# Patient Record
Sex: MALE | Race: BLACK OR AFRICAN AMERICAN | URBAN - METROPOLITAN AREA
[De-identification: names, ages, dates, MRNs, and addresses within clinical notes are randomized per-mention and may not be internally consistent; named-entity substitution may affect disease eponyms.]

---

## 2017-03-17 PROBLEM — R07.9 CHEST PAIN: Status: ACTIVE | Noted: 2017-03-17

## 2017-03-27 ENCOUNTER — OFFICE VISIT (OUTPATIENT)
Dept: INTERNAL MEDICINE CLINIC | Age: 52
End: 2017-03-27

## 2017-03-27 VITALS
OXYGEN SATURATION: 98 % | TEMPERATURE: 98.1 F | SYSTOLIC BLOOD PRESSURE: 120 MMHG | WEIGHT: 196 LBS | DIASTOLIC BLOOD PRESSURE: 96 MMHG | BODY MASS INDEX: 26.58 KG/M2 | HEART RATE: 74 BPM

## 2017-03-27 DIAGNOSIS — I10 ESSENTIAL HYPERTENSION: Primary | ICD-10-CM

## 2017-03-27 DIAGNOSIS — R07.9 CHEST PAIN, UNSPECIFIED TYPE: ICD-10-CM

## 2017-03-27 DIAGNOSIS — D86.9 SARCOIDOSIS: ICD-10-CM

## 2017-03-27 DIAGNOSIS — R91.8 PULMONARY NODULES: ICD-10-CM

## 2017-03-27 DIAGNOSIS — Z98.890 H/O LAMINECTOMY: ICD-10-CM

## 2017-03-27 PROCEDURE — 99204 OFFICE O/P NEW MOD 45 MIN: CPT | Performed by: NURSE PRACTITIONER

## 2017-03-27 RX ORDER — METOPROLOL SUCCINATE 50 MG/1
100 TABLET, EXTENDED RELEASE ORAL DAILY
COMMUNITY

## 2017-03-27 ASSESSMENT — ENCOUNTER SYMPTOMS
NAUSEA: 0
ROS SKIN COMMENTS: SARCOIDOSIS
SHORTNESS OF BREATH: 0
CHOKING: 0
APNEA: 0
BACK PAIN: 0
CONSTIPATION: 0
STRIDOR: 0
COUGH: 0
COLOR CHANGE: 0
VOMITING: 0
DIARRHEA: 0

## 2018-12-20 RX ORDER — SODIUM, POTASSIUM,MAG SULFATES 17.5-3.13G
1 SOLUTION, RECONSTITUTED, ORAL ORAL TAKE AS DIRECTED
Qty: 2 BOTTLE | Refills: 0 | Status: SHIPPED | OUTPATIENT
Start: 2018-12-20 | End: 2019-03-29

## 2018-12-28 ENCOUNTER — OUTSIDE FACILITY SERVICE (OUTPATIENT)
Dept: GASTROENTEROLOGY | Facility: CLINIC | Age: 53
End: 2018-12-28

## 2018-12-28 ENCOUNTER — TELEPHONE (OUTPATIENT)
Dept: GASTROENTEROLOGY | Facility: CLINIC | Age: 53
End: 2018-12-28

## 2018-12-28 PROCEDURE — G0105 COLORECTAL SCRN; HI RISK IND: HCPCS | Performed by: INTERNAL MEDICINE

## 2018-12-28 NOTE — TELEPHONE ENCOUNTER
----- Message from Peri Stanton Rep sent at 12/26/2018  1:50 PM EST -----  Regarding: PREP  PLEASE CALL MR [facility]ISOSTOMO IN TO [facility] IN [place]

## 2019-03-05 ENCOUNTER — TELEPHONE (OUTPATIENT)
Dept: ONCOLOGY | Facility: CLINIC | Age: 54
End: 2019-03-05

## 2019-03-05 NOTE — TELEPHONE ENCOUNTER
[phone] Dr. Matias Prater  [facility] for medical records to fax labs on patient ASAP for upcoming appt with us.  [id number]  ~Shell

## 2019-03-08 ENCOUNTER — LAB (OUTPATIENT)
Dept: LAB | Facility: HOSPITAL | Age: 54
End: 2019-03-08

## 2019-03-08 ENCOUNTER — CONSULT (OUTPATIENT)
Dept: ONCOLOGY | Facility: CLINIC | Age: 54
End: 2019-03-08

## 2019-03-08 VITALS
TEMPERATURE: 97.8 F | RESPIRATION RATE: 16 BRPM | DIASTOLIC BLOOD PRESSURE: 98 MMHG | BODY MASS INDEX: 30.01 KG/M2 | HEIGHT: 68 IN | WEIGHT: 198 LBS | SYSTOLIC BLOOD PRESSURE: 141 MMHG | HEART RATE: 79 BPM | OXYGEN SATURATION: 98 %

## 2019-03-08 DIAGNOSIS — E83.118 OTHER HEMOCHROMATOSIS: Primary | ICD-10-CM

## 2019-03-08 DIAGNOSIS — E83.118 OTHER HEMOCHROMATOSIS: ICD-10-CM

## 2019-03-08 LAB
ALBUMIN SERPL-MCNC: 4.44 G/DL (ref 3.2–4.8)
ALBUMIN/GLOB SERPL: 1.5 G/DL (ref 1.5–2.5)
ALP SERPL-CCNC: 61 U/L (ref 25–100)
ALT SERPL W P-5'-P-CCNC: 24 U/L (ref 7–40)
ANION GAP SERPL CALCULATED.3IONS-SCNC: 9 MMOL/L (ref 3–11)
AST SERPL-CCNC: 31 U/L (ref 0–33)
BILIRUB SERPL-MCNC: 0.9 MG/DL (ref 0.3–1.2)
BUN BLD-MCNC: 23 MG/DL (ref 9–23)
BUN/CREAT SERPL: 23 (ref 7–25)
CALCIUM SPEC-SCNC: 10.1 MG/DL (ref 8.7–10.4)
CHLORIDE SERPL-SCNC: 101 MMOL/L (ref 99–109)
CO2 SERPL-SCNC: 28 MMOL/L (ref 20–31)
CREAT BLD-MCNC: 1 MG/DL (ref 0.6–1.3)
CRP SERPL-MCNC: 0.02 MG/DL (ref 0–1)
ERYTHROCYTE [DISTWIDTH] IN BLOOD BY AUTOMATED COUNT: 13.5 % (ref 11.3–14.5)
ERYTHROCYTE [SEDIMENTATION RATE] IN BLOOD: 8 MM/HR (ref 0–20)
FERRITIN SERPL-MCNC: 717 NG/ML (ref 22–322)
GFR SERPL CREATININE-BSD FRML MDRD: 94 ML/MIN/1.73
GLOBULIN UR ELPH-MCNC: 3.1 GM/DL
GLUCOSE BLD-MCNC: 99 MG/DL (ref 70–100)
HCT VFR BLD AUTO: 43.9 % (ref 38.9–50.9)
HGB BLD-MCNC: 15.1 G/DL (ref 13.1–17.5)
IRON 24H UR-MRATE: 91 MCG/DL (ref 50–175)
IRON SATN MFR SERPL: 28 % (ref 20–50)
LYMPHOCYTES # BLD AUTO: 0.9 10*3/MM3 (ref 0.6–4.8)
LYMPHOCYTES NFR BLD AUTO: 19 % (ref 24–44)
MCH RBC QN AUTO: 29.5 PG (ref 27–31)
MCHC RBC AUTO-ENTMCNC: 34.4 G/DL (ref 32–36)
MCV RBC AUTO: 85.8 FL (ref 80–99)
MONOCYTES # BLD AUTO: 0.2 10*3/MM3 (ref 0–1)
MONOCYTES NFR BLD AUTO: 3.8 % (ref 0–12)
NEUTROPHILS # BLD AUTO: 3.9 10*3/MM3 (ref 1.5–8.3)
NEUTROPHILS NFR BLD AUTO: 77.2 % (ref 41–71)
PLATELET # BLD AUTO: 223 10*3/MM3 (ref 150–450)
PMV BLD AUTO: 8.1 FL (ref 6–12)
POTASSIUM BLD-SCNC: 4.2 MMOL/L (ref 3.5–5.5)
PROT SERPL-MCNC: 7.5 G/DL (ref 5.7–8.2)
RBC # BLD AUTO: 5.12 10*6/MM3 (ref 4.2–5.76)
SODIUM BLD-SCNC: 138 MMOL/L (ref 132–146)
TIBC SERPL-MCNC: 320 MCG/DL (ref 250–450)
WBC NRBC COR # BLD: 5 10*3/MM3 (ref 3.5–10.8)

## 2019-03-08 PROCEDURE — 82728 ASSAY OF FERRITIN: CPT

## 2019-03-08 PROCEDURE — 81256 HFE GENE: CPT

## 2019-03-08 PROCEDURE — 83550 IRON BINDING TEST: CPT

## 2019-03-08 PROCEDURE — 80053 COMPREHEN METABOLIC PANEL: CPT

## 2019-03-08 PROCEDURE — 85652 RBC SED RATE AUTOMATED: CPT

## 2019-03-08 PROCEDURE — 83540 ASSAY OF IRON: CPT

## 2019-03-08 PROCEDURE — 36415 COLL VENOUS BLD VENIPUNCTURE: CPT

## 2019-03-08 PROCEDURE — 86140 C-REACTIVE PROTEIN: CPT

## 2019-03-08 PROCEDURE — 99204 OFFICE O/P NEW MOD 45 MIN: CPT | Performed by: INTERNAL MEDICINE

## 2019-03-08 PROCEDURE — 85025 COMPLETE CBC W/AUTO DIFF WBC: CPT

## 2019-03-08 RX ORDER — METOPROLOL SUCCINATE 50 MG/1
100 TABLET, EXTENDED RELEASE ORAL
COMMUNITY

## 2019-03-08 RX ORDER — LISINOPRIL AND HYDROCHLOROTHIAZIDE 20; 12.5 MG/1; MG/1
1 TABLET ORAL
COMMUNITY
End: 2022-05-26

## 2019-03-08 RX ORDER — HYDROXYCHLOROQUINE SULFATE 200 MG/1
200 TABLET, FILM COATED ORAL
COMMUNITY
Start: 2017-03-17

## 2019-03-08 RX ORDER — ASPIRIN 81 MG/1
81 TABLET, CHEWABLE ORAL
COMMUNITY
Start: 2017-03-17

## 2019-03-08 RX ORDER — M-VIT,TX,IRON,MINS/CALC/FOLIC 27MG-0.4MG
1 TABLET ORAL
COMMUNITY
End: 2019-03-29

## 2019-03-08 RX ORDER — OMEPRAZOLE 20 MG/1
20 CAPSULE, DELAYED RELEASE ORAL
COMMUNITY

## 2019-03-08 RX ORDER — AMLODIPINE BESYLATE 10 MG/1
10 TABLET ORAL
COMMUNITY

## 2019-03-08 NOTE — PROGRESS NOTES
CHIEF COMPLAINT: Elevated liver enzymes    REASON FOR REFERRAL: Elevated liver enzymes      RECORDS OBTAINED  Records of the patients history including those obtained from Matias Prater were reviewed and summarized in detail.    Oncology/Hematology History    1. Elevated ferritin  2. Diverticulitis with colovesical fistula status post surgery May 2018  3. History of sarcoidosis  4. Reflux esophagitis    -3/8/2019: Initial visit with me.  2/15/2019 ferritin 686 with iron saturation 28% iron 90 and normal total iron binding capacity.  Has been drinking heavily by his own admission due to recent stressors.  Is due for neurosurgical intervention for cervical disc disease.  Has had a  colonoscopy with Dr. Larsen in the last few months.  Treats himself for reflux esophagitis as well.  I will check his CBC and CMP along with repeat ferritin and iron indices along with transferrin saturation and an HFE gene mutation.  I strongly suspect in light of the normal iron levels that the elevated ferritin is an acute phase reactant due to a multiplicity of possible inflammatory sources.        Other hemochromatosis    3/8/2019 Initial Diagnosis     Other hemochromatosis            HISTORY OF PRESENT ILLNESS:  The patient is a 54 y.o.  male, referred for elevated ferritin.  See above for his hematology history of present illness    REVIEW OF SYSTEMS:  A 14 point review of systems was performed and is negative except as noted above.    Past Medical History:   Diagnosis Date   • Anemia    • Arthritis    • Asthma    • Colonic fistula    • Hypertension      Past Surgical History:   Procedure Laterality Date   • APPENDECTOMY  1981   • COLON RESECTION     • KNEE ACL RECONSTRUCTION     • LAMINECTOMY      15 years.        Current Outpatient Medications on File Prior to Visit   Medication Sig Dispense Refill   • aspirin 81 MG chewable tablet Chew 81 mg.     • hydroxychloroquine (PLAQUENIL) 200 MG tablet Take 200 mg by mouth.     • amLODIPine  "(NORVASC) 10 MG tablet Take 10 mg by mouth.     • lisinopril-hydrochlorothiazide (PRINZIDE,ZESTORETIC) 20-12.5 MG per tablet Take 1 tablet by mouth.     • metoprolol succinate XL (TOPROL-XL) 50 MG 24 hr tablet Take 100 mg by mouth.     • omeprazole (priLOSEC) 20 MG capsule Take 20 mg by mouth.     • sodium-potassium-magnesium sulfates (SUPREP BOWEL PREP KIT) 17.5-3.13-1.6 GM/177ML solution oral solution Take 1 bottle by mouth Take As Directed. Follow instructions that were mailed to your home. If you didn't receive these call [phone]. 2 bottle 0   • therapeutic multivitamin-minerals (THERAGRAN-M) tablet Take 1 tablet by mouth.       No current facility-administered medications on file prior to visit.        No Known Allergies    Social History     Socioeconomic History   • Marital status:      Spouse name: Not on file   • Number of children: 2   • Years of education: Not on file   • Highest education level: Not on file   Tobacco Use   • Smoking status: Former Smoker   Substance and Sexual Activity   • Alcohol use: Yes     Frequency: 2-3 times a week   • Drug use: Yes     Types: Marijuana       Family History   Problem Relation Age of Onset   • Heart disease Father    • Alzheimer's disease Maternal Grandmother    • Heart block Maternal Grandmother    • Heart block Maternal Grandfather    • Diabetes Paternal Grandmother    • Heart attack Paternal Grandfather        PHYSICAL EXAM:    /98   Pulse 79   Temp 97.8 °F (36.6 °C) (Temporal)   Resp 16   Ht 172.7 cm (68\")   Wt 89.8 kg (198 lb)   SpO2 98%   BMI 30.11 kg/m²     ECOG: (1) Restricted in physically strenuous activity, ambulatory and able to do work of light nature  General: well appearing male in no acute distress  HEENT: sclera anicteric, oropharynx clear  Lymphatics: no cervical, supraclavicular, inguinal, or axillary adenopathy  Cardiovascular: regular rate and rhythm, no murmurs  Neck: Supple; No thyromegaly  Lungs: clear to " auscultation bilaterally. No respiratory distress.   Abdomen: soft, nontender, nondistended.  No palpable organomegaly  Extremities: no cyanosis, clubbing, edema, or cords  Skin: no rashes, lesions, bruising, or petechiae  Neuro: Alert and oriented x 4; Moving all extremities.  Psych: No anxiety or depression    No results found for: HGB, HCT, MCV, PLT, WBC, NEUTROABS, LYMPHSABS, MONOSABS, EOSABS, BASOSABS  No results found for: GLUCOSE, BUN, CREATININE, NA, K, CL, CO2, CALCIUM, PROTEINTOT, ALBUMIN, BILITOT, ALKPHOS, AST, ALT        Assessment/Plan     1. Elevated ferritin  2. Diverticulitis with colovesical fistula status post surgery May 2018  3. History of sarcoidosis  4. Reflux esophagitis    -3/8/2019: Initial visit with me.  2/15/2019 ferritin 686 with iron saturation 28% iron 90 and normal total iron binding capacity.  Has been drinking heavily by his own admission due to recent stressors.  Is due for neurosurgical intervention for cervical disc disease.  Has had a  colonoscopy with Dr. Larsen in the last few months.  Treats himself for reflux esophagitis as well.  I will check his CBC and CMP along with repeat ferritin and iron indices along with transferrin saturation and an HFE gene mutation.  I strongly suspect in light of the normal iron levels that the elevated ferritin is an acute phase reactant due to a multiplicity of possible inflammatory sources.    I discussed with patient 45 minutes greater than 50% spent counseling    Stephen Clifton MD    3/8/2019

## 2019-03-12 LAB — HFE GENE MUT ANL BLD/T: NORMAL

## 2019-03-29 ENCOUNTER — OFFICE VISIT (OUTPATIENT)
Dept: ONCOLOGY | Facility: CLINIC | Age: 54
End: 2019-03-29

## 2019-03-29 VITALS
SYSTOLIC BLOOD PRESSURE: 140 MMHG | HEART RATE: 81 BPM | RESPIRATION RATE: 18 BRPM | BODY MASS INDEX: 30.62 KG/M2 | TEMPERATURE: 97.8 F | WEIGHT: 202 LBS | DIASTOLIC BLOOD PRESSURE: 95 MMHG | OXYGEN SATURATION: 99 % | HEIGHT: 68 IN

## 2019-03-29 DIAGNOSIS — R79.89 ELEVATED FERRITIN: Primary | ICD-10-CM

## 2019-03-29 PROCEDURE — 99213 OFFICE O/P EST LOW 20 MIN: CPT | Performed by: INTERNAL MEDICINE

## 2019-03-29 RX ORDER — FERROUS SULFATE 325(65) MG
325 TABLET ORAL
COMMUNITY

## 2019-03-29 NOTE — PROGRESS NOTES
CHIEF COMPLAINT: Elevated ferritin    Problem List:  Oncology/Hematology History    1. Elevated ferritin  2. Diverticulitis with colovesical fistula status post surgery May 2018  3. History of sarcoidosis  4. Reflux esophagitis    -3/8/2019: Initial visit with me.  2/15/2019 ferritin 686 with iron saturation 28% iron 90 and normal total iron binding capacity.  Has been drinking heavily by his own admission due to recent stressors.  Is due for neurosurgical intervention for cervical disc disease.  Has had a  colonoscopy with Dr. Larsen in the last few months.  Treats himself for reflux esophagitis as well.  I will check his CBC and CMP along with repeat ferritin and iron indices along with transferrin saturation and an HFE gene mutation.  I strongly suspect in light of the normal iron levels that the elevated ferritin is an acute phase reactant due to a multiplicity of possible inflammatory sources.    -3/8/2019 data: HFE gene mutation negative.  CBC normal.  Sedimentation rate and C-reactive protein normal.  Iron indices normal.  Liver enzymes normal.  Ferritin 717    -3/29/2019 visit: He is feeling fine with no specific complaints.  I am not sure why his ferritin was checked to begin with but the rest of his labs are unremarkable.  He had been drinking heavily due to stress and has said that has decreased.  Liver injury can cause elevation in iron and vice versa but I do not see elevation of his iron just the ferritin and that makes hemochromatosis unlikely with the negative HFE gene mutation.  I suggested he continue to stay sober and to follow-up with Dr. Larsen.        Other hemochromatosis    3/8/2019 Initial Diagnosis     Other hemochromatosis            HISTORY OF PRESENT ILLNESS:  The patient is a 54 y.o. male, here for follow up on management of elevated ferritin.  See above for hematology history of present illness      Past Medical History:   Diagnosis Date   • Anemia    • Arthritis    • Asthma    • Colonic  "fistula    • Hypertension      Past Surgical History:   Procedure Laterality Date   • APPENDECTOMY  1981   • COLON RESECTION     • KNEE ACL RECONSTRUCTION     • LAMINECTOMY      15 years.        No Known Allergies    Family History and Social History reviewed and changed as necessary      REVIEW OF SYSTEM:   Review of Systems   Constitutional: Negative for appetite change, chills, diaphoresis, fatigue, fever and unexpected weight change.   HENT:   Negative for mouth sores, sore throat and trouble swallowing.    Eyes: Negative for icterus.   Respiratory: Negative for cough, hemoptysis and shortness of breath.    Cardiovascular: Negative for chest pain, leg swelling and palpitations.   Gastrointestinal: Negative for abdominal distention, abdominal pain, blood in stool, constipation, diarrhea, nausea and vomiting.   Endocrine: Negative for hot flashes.   Genitourinary: Negative for bladder incontinence, difficulty urinating, dysuria, frequency and hematuria.    Musculoskeletal: Negative for gait problem, neck pain and neck stiffness.   Skin: Negative for rash.   Neurological: Negative for dizziness, gait problem, headaches, light-headedness and numbness.   Hematological: Negative for adenopathy. Does not bruise/bleed easily.   Psychiatric/Behavioral: Negative for depression. The patient is not nervous/anxious.    All other systems reviewed and are negative.       PHYSICAL EXAM    Vitals:    03/29/19 1131   BP: 140/95   Pulse: 81   Resp: 18   Temp: 97.8 °F (36.6 °C)   SpO2: 99%   Weight: 91.6 kg (202 lb)   Height: 172.7 cm (68\")     Constitutional: Appears well-developed and well-nourished. No distress.   ECOG: (0) Fully active, able to carry on all predisease performance without restriction  HENT:   Head: Normocephalic.   Mouth/Throat: Oropharynx is clear and moist.   Eyes: Conjunctivae are normal. Pupils are equal, round, and reactive to light. No scleral icterus.   Neck: Neck supple. No JVD present. No thyromegaly " present.   Cardiovascular: Normal rate, regular rhythm and normal heart sounds.    Pulmonary/Chest: Breath sounds normal. No respiratory distress.   Abdominal: Soft. Exhibits no distension and no mass. There is no hepatosplenomegaly. There is no tenderness. There is no rebound and no guarding.   Musculoskeletal:Exhibits no edema, tenderness or deformity.   Neurological: Alert and oriented to person, place, and time. Exhibits normal muscle tone.   Skin: No ecchymosis, no petechiae and no rash noted. Not diaphoretic. No cyanosis. Nails show no clubbing.   Psychiatric: Normal mood and affect.   Vitals reviewed.      Lab Results   Component Value Date    HGB 15.1 03/08/2019    HCT 43.9 03/08/2019    MCV 85.8 03/08/2019     03/08/2019    WBC 5.00 03/08/2019    NEUTROABS 3.90 03/08/2019    LYMPHSABS 0.90 03/08/2019    MONOSABS 0.20 03/08/2019       Lab Results   Component Value Date    GLUCOSE 99 03/08/2019    BUN 23 03/08/2019    CREATININE 1.00 03/08/2019     03/08/2019    K 4.2 03/08/2019     03/08/2019    CO2 28.0 03/08/2019    CALCIUM 10.1 03/08/2019    PROTEINTOT 7.5 03/08/2019    ALBUMIN 4.44 03/08/2019    BILITOT 0.9 03/08/2019    ALKPHOS 61 03/08/2019    AST 31 03/08/2019    ALT 24 03/08/2019                   ASSESSMENT & PLAN:    1. Elevated ferritin  2. Diverticulitis with colovesical fistula status post surgery May 2018  3. History of sarcoidosis  4. Reflux esophagitis    -3/8/2019: Initial visit with me.  2/15/2019 ferritin 686 with iron saturation 28% iron 90 and normal total iron binding capacity.  Has been drinking heavily by his own admission due to recent stressors.  Is due for neurosurgical intervention for cervical disc disease.  Has had a  colonoscopy with Dr. Larsen in the last few months.  Treats himself for reflux esophagitis as well.  I will check his CBC and CMP along with repeat ferritin and iron indices along with transferrin saturation and an HFE gene mutation.  I strongly  suspect in light of the normal iron levels that the elevated ferritin is an acute phase reactant due to a multiplicity of possible inflammatory sources.    -3/8/2019 data: HFE gene mutation negative.  CBC normal.  Sedimentation rate and C-reactive protein normal.  Iron indices normal.  Liver enzymes normal.  Ferritin 717    -3/29/2019 visit: He is feeling fine with no specific complaints.  I am not sure why his ferritin was checked to begin with but the rest of his labs are unremarkable.  He had been drinking heavily due to stress and has said that has decreased.  Liver injury can cause elevation in iron and vice versa but I do not see elevation of his iron just the ferritin and that makes hemochromatosis unlikely with the negative HFE gene mutation.  I suggested he continue to stay sober and to follow-up with Dr. Larsen.  He needs to follow-up with primary care and whoever else had been managing his sarcoidosis in the past.  This could certainly be a cause for inflammatory related ferritin elevation but nothing from a hematological standpoint is found to suggest need for further hematology/oncology follow-up.      Stephen Clifton MD    03/29/2019

## 2020-08-04 PROCEDURE — U0003 INFECTIOUS AGENT DETECTION BY NUCLEIC ACID (DNA OR RNA); SEVERE ACUTE RESPIRATORY SYNDROME CORONAVIRUS 2 (SARS-COV-2) (CORONAVIRUS DISEASE [COVID-19]), AMPLIFIED PROBE TECHNIQUE, MAKING USE OF HIGH THROUGHPUT TECHNOLOGIES AS DESCRIBED BY CMS-2020-01-R: HCPCS | Performed by: FAMILY MEDICINE

## 2020-08-06 ENCOUNTER — TELEPHONE (OUTPATIENT)
Dept: URGENT CARE | Facility: CLINIC | Age: 55
End: 2020-08-06

## 2020-08-06 NOTE — TELEPHONE ENCOUNTER
Reviewed By Dr. Oliver. Spoke with Pt informed lab result was not detected. Pt verbalized understanding stated his symptoms have not improved. Informed Pt if symptoms persists to follow up with PCP for further evaluation. Also informed Pt the CDC still recommends 10 day of quarantine from onset of symptoms. Pt verbalized understanding no further questions.

## 2020-08-31 ENCOUNTER — TELEPHONE (OUTPATIENT)
Dept: ONCOLOGY | Facility: CLINIC | Age: 55
End: 2020-08-31

## 2020-08-31 NOTE — TELEPHONE ENCOUNTER
FYI-REC'VD FAX THROUGH [employer] FROM DR. CHAO AT [facility] FOR DR. BOWERS TO REVIEW PT RECORDS. PT LAST SAW DR. BOWERS 3/29/19.   RECORDS ARE LABELED AS EXTERNAL MED RECS 8/28/20

## 2020-11-02 ENCOUNTER — CONSULT (OUTPATIENT)
Dept: ONCOLOGY | Facility: CLINIC | Age: 55
End: 2020-11-02

## 2020-11-02 ENCOUNTER — LAB (OUTPATIENT)
Dept: LAB | Facility: HOSPITAL | Age: 55
End: 2020-11-02

## 2020-11-02 VITALS
HEART RATE: 88 BPM | BODY MASS INDEX: 41.52 KG/M2 | TEMPERATURE: 96.9 F | DIASTOLIC BLOOD PRESSURE: 102 MMHG | SYSTOLIC BLOOD PRESSURE: 174 MMHG | OXYGEN SATURATION: 97 % | RESPIRATION RATE: 20 BRPM | WEIGHT: 274 LBS | HEIGHT: 68 IN

## 2020-11-02 DIAGNOSIS — R79.89 ELEVATED FERRITIN: ICD-10-CM

## 2020-11-02 DIAGNOSIS — R79.89 ELEVATED FERRITIN: Primary | ICD-10-CM

## 2020-11-02 LAB
ALBUMIN SERPL-MCNC: 4.2 G/DL (ref 3.5–5.2)
ALBUMIN/GLOB SERPL: 1.3 G/DL
ALP SERPL-CCNC: 81 U/L (ref 39–117)
ALT SERPL W P-5'-P-CCNC: 22 U/L (ref 1–41)
ANION GAP SERPL CALCULATED.3IONS-SCNC: 14 MMOL/L (ref 5–15)
AST SERPL-CCNC: 29 U/L (ref 1–40)
BILIRUB SERPL-MCNC: 0.3 MG/DL (ref 0–1.2)
BUN SERPL-MCNC: 15 MG/DL (ref 6–20)
BUN/CREAT SERPL: 15.3 (ref 7–25)
CALCIUM SPEC-SCNC: 9.8 MG/DL (ref 8.6–10.5)
CHLORIDE SERPL-SCNC: 98 MMOL/L (ref 98–107)
CO2 SERPL-SCNC: 26 MMOL/L (ref 22–29)
CREAT SERPL-MCNC: 0.98 MG/DL (ref 0.76–1.27)
CRP SERPL-MCNC: 0.69 MG/DL (ref 0–0.5)
ERYTHROCYTE [DISTWIDTH] IN BLOOD BY AUTOMATED COUNT: 15.3 % (ref 12.3–15.4)
ERYTHROCYTE [SEDIMENTATION RATE] IN BLOOD: 23 MM/HR (ref 0–20)
FERRITIN SERPL-MCNC: 943.9 NG/ML (ref 30–400)
GFR SERPL CREATININE-BSD FRML MDRD: 96 ML/MIN/1.73
GLOBULIN UR ELPH-MCNC: 3.3 GM/DL
GLUCOSE SERPL-MCNC: 112 MG/DL (ref 65–99)
HCT VFR BLD AUTO: 41.9 % (ref 37.5–51)
HGB BLD-MCNC: 13.8 G/DL (ref 13–17.7)
IRON 24H UR-MRATE: 50 MCG/DL (ref 59–158)
IRON SATN MFR SERPL: 13 % (ref 20–50)
LYMPHOCYTES # BLD AUTO: 1.7 10*3/MM3 (ref 0.7–3.1)
LYMPHOCYTES NFR BLD AUTO: 23.4 % (ref 19.6–45.3)
MCH RBC QN AUTO: 28.4 PG (ref 26.6–33)
MCHC RBC AUTO-ENTMCNC: 33 G/DL (ref 31.5–35.7)
MCV RBC AUTO: 86 FL (ref 79–97)
MONOCYTES # BLD AUTO: 0.6 10*3/MM3 (ref 0.1–0.9)
MONOCYTES NFR BLD AUTO: 7.5 % (ref 5–12)
NEUTROPHILS NFR BLD AUTO: 5.1 10*3/MM3 (ref 1.7–7)
NEUTROPHILS NFR BLD AUTO: 69.1 % (ref 42.7–76)
PLATELET # BLD AUTO: 299 10*3/MM3 (ref 140–450)
PMV BLD AUTO: 7.4 FL (ref 6–12)
POTASSIUM SERPL-SCNC: 4.3 MMOL/L (ref 3.5–5.2)
PROT SERPL-MCNC: 7.5 G/DL (ref 6–8.5)
RBC # BLD AUTO: 4.87 10*6/MM3 (ref 4.14–5.8)
SODIUM SERPL-SCNC: 138 MMOL/L (ref 136–145)
TIBC SERPL-MCNC: 380 MCG/DL (ref 298–536)
TRANSFERRIN SERPL-MCNC: 255 MG/DL (ref 200–360)
WBC # BLD AUTO: 7.4 10*3/MM3 (ref 3.4–10.8)

## 2020-11-02 PROCEDURE — 85025 COMPLETE CBC W/AUTO DIFF WBC: CPT

## 2020-11-02 PROCEDURE — 84466 ASSAY OF TRANSFERRIN: CPT

## 2020-11-02 PROCEDURE — 83540 ASSAY OF IRON: CPT

## 2020-11-02 PROCEDURE — 99215 OFFICE O/P EST HI 40 MIN: CPT | Performed by: INTERNAL MEDICINE

## 2020-11-02 PROCEDURE — 36415 COLL VENOUS BLD VENIPUNCTURE: CPT

## 2020-11-02 PROCEDURE — 85652 RBC SED RATE AUTOMATED: CPT

## 2020-11-02 PROCEDURE — 80053 COMPREHEN METABOLIC PANEL: CPT

## 2020-11-02 PROCEDURE — 82728 ASSAY OF FERRITIN: CPT

## 2020-11-02 PROCEDURE — 86140 C-REACTIVE PROTEIN: CPT

## 2020-11-02 NOTE — PROGRESS NOTES
CHIEF COMPLAINT: Elevated ferritin    Problem List:  Oncology/Hematology History Overview Note   1. Elevated ferritin  2. Diverticulitis with colovesical fistula status post surgery May 2018  3. History of sarcoidosis  4. Reflux esophagitis    -3/8/2019: Initial visit with me.  2/15/2019 ferritin 686 with iron saturation 28% iron 90 and normal total iron binding capacity.  Has been drinking heavily by his own admission due to recent stressors.  Is due for neurosurgical intervention for cervical disc disease.  Has had a  colonoscopy with Dr. Larsen in the last few months.  Treats himself for reflux esophagitis as well.  I will check his CBC and CMP along with repeat ferritin and iron indices along with transferrin saturation and an HFE gene mutation.  I strongly suspect in light of the normal iron levels that the elevated ferritin is an acute phase reactant due to a multiplicity of possible inflammatory sources.    -3/8/2019 data: HFE gene mutation negative.  CBC normal.  Sedimentation rate and C-reactive protein normal.  Iron indices normal.  Liver enzymes normal.  Ferritin 717    -3/29/2019 visit: He is feeling fine with no specific complaints.  I am not sure why his ferritin was checked to begin with but the rest of his labs are unremarkable.  He had been drinking heavily due to stress and has said that has decreased.  Liver injury can cause elevation in iron and vice versa but I do not see elevation of his iron just the ferritin and that makes hemochromatosis unlikely with the negative HFE gene mutation.  I suggested he continue to stay sober and to follow-up with Dr. Larsen.    -8/21/2020 data: Ferritin 1235 with iron 86, total iron-binding capacity 265.  B12 414.  Folate 2.1.  Hemoglobin 12.1.  Creatinine improved to 1.32 from 2.38 on 8/17/2020.  AST 23 with ALT 21.  Complains of gynecomastia.    -11/2/2020 hematology follow-up visit: Ferritin not over 3000 making HLH or intense inflammatory response to sepsis  unlikely and does not fit the clinical picture.  We will check transferrin saturation and if less than 45%, iron overload very unlikely especially with negative HFE gene mutation.  If transferrin saturation turns out to be over 45% then, even with negative HFE gene mutation, I would still get MRI of liver or liver biopsy for liver iron quantitation though I think the odds of that is very unlikely.    Causes of high ferritin include:  A.  Intense inflammatory response with release of ferritin from necrotic cells such as sepsis, HLH, fulminant hepatic failure, hematologic malignancies, or stills disease but typically the ferritin is usually over 3000 mcg/L.  Hence this is unlikely  B.  Iron overload states such as hemochromatosis, transfusional iron overload, or ineffective erythropoietic syndrome such as thalassemia that cause increased iron absorption.  In these patients in men, the ferritin is usually over 200 to 300 mcg/L and are often over 1447-9430 mcg/L at the time of diagnosis.  He has never been anemic nor microcytic.  Hence, this is an unlikely cause  C.  Chronic liver disease such as hepatitis, HDEZ, or alcoholic liver disease and is usually associated with a ferritin of 300 to 1000 mcg/L without necessarily concomitant iron overload.  Though over thousand, with his alcohol history this is a possibility though his liver enzymes have been normal.  D.  Other chronic conditions such as anemia of chronic disease, metabolic syndrome (obesity, dyslipidemia, hypertension, insulin resistance) or hyperthyroidism.  Usually the ferritin is less than 2-3 times the upper limit of normal of 322 mcg/L.  Hence, with ferritin over thousand this would be unlikely.    Therefore, I will attempt to get MRI liver iron quantitation for completeness sake and ask him to follow-up as I have previously asked him to follow-up with gastroenterology, Dr. Larsen.  Having said all that, I strongly doubt he has hemochromatosis though I will  repeat his ferritin, sedimentation rate, C-reactive protein, transferrin saturation, and iron indices and do a phone visit with him to go over all this.     Elevated ferritin   3/8/2019 Initial Diagnosis    Other hemochromatosis         HISTORY OF PRESENT ILLNESS:  The patient is a 55 y.o. male, here for follow up on management of elevated ferritin.  Over thousand in August.      Past Medical History:   Diagnosis Date   • Anemia    • Arthritis    • Asthma    • Colonic fistula    • Hypertension      Past Surgical History:   Procedure Laterality Date   • APPENDECTOMY  1981   • COLON RESECTION     • KNEE ACL RECONSTRUCTION     • LAMINECTOMY      15 years.        No Known Allergies    Family History and Social History reviewed and changed as necessary      REVIEW OF SYSTEM:   Review of Systems   Constitutional: Negative for appetite change, chills, diaphoresis, fatigue, fever and unexpected weight change.   HENT:   Negative for mouth sores, sore throat and trouble swallowing.    Eyes: Negative for icterus.   Respiratory: Negative for cough, hemoptysis and shortness of breath.    Cardiovascular: Negative for chest pain, leg swelling and palpitations.   Gastrointestinal: Negative for abdominal distention, abdominal pain, blood in stool, constipation, diarrhea, nausea and vomiting.   Endocrine: Negative for hot flashes.   Genitourinary: Negative for bladder incontinence, difficulty urinating, dysuria, frequency and hematuria.    Musculoskeletal: Negative for gait problem, neck pain and neck stiffness.   Skin: Negative for rash.   Neurological: Negative for dizziness, gait problem, headaches, light-headedness and numbness.   Hematological: Negative for adenopathy. Does not bruise/bleed easily.   Psychiatric/Behavioral: Negative for depression. The patient is not nervous/anxious.    All other systems reviewed and are negative.       PHYSICAL EXAM    Vitals:    11/02/20 1422   BP: (!) 174/102   Pulse: 88   Resp: 20   Temp:  "96.9 °F (36.1 °C)   SpO2: 97%   Weight: 124 kg (274 lb)   Height: 172.7 cm (68\")     Vitals:    11/02/20 1422   PainSc: 0-No pain        Constitutional: Appears well-developed and well-nourished. No distress.   ECOG: (0) Fully Active - Able to Carry On All Pre-disease Performance Without Restriction  HENT:   Head: Normocephalic.   Mouth/Throat: Oropharynx is clear and moist.   Eyes: Conjunctivae are normal. Pupils are equal, round, and reactive to light. No scleral icterus.   Neck: Neck supple. No JVD present. No thyromegaly present.   Cardiovascular: Normal rate, regular rhythm and normal heart sounds.    Pulmonary/Chest: Breath sounds normal. No respiratory distress.   Abdominal: Soft. Exhibits no distension and no mass. There is no hepatosplenomegaly. There is no tenderness. There is no rebound and no guarding.   Musculoskeletal:Exhibits no edema, tenderness or deformity.   Neurological: Alert and oriented to person, place, and time. Exhibits normal muscle tone.   Skin: No ecchymosis, no petechiae and no rash noted. Not diaphoretic. No cyanosis. Nails show no clubbing.   Psychiatric: Normal mood and affect.   Vitals reviewed.      Lab Results   Component Value Date    HGB 15.1 03/08/2019    HCT 43.9 03/08/2019    MCV 85.8 03/08/2019     03/08/2019    WBC 5.00 03/08/2019    NEUTROABS 3.90 03/08/2019    LYMPHSABS 0.90 03/08/2019    MONOSABS 0.20 03/08/2019       Lab Results   Component Value Date    GLUCOSE 99 03/08/2019    BUN 23 03/08/2019    CREATININE 1.00 03/08/2019     03/08/2019    K 4.2 03/08/2019     03/08/2019    CO2 28.0 03/08/2019    CALCIUM 10.1 03/08/2019    PROTEINTOT 7.5 03/08/2019    ALBUMIN 4.44 03/08/2019    BILITOT 0.9 03/08/2019    ALKPHOS 61 03/08/2019    AST 31 03/08/2019    ALT 24 03/08/2019                   ASSESSMENT & PLAN:  5. Elevated ferritin  6. Diverticulitis with colovesical fistula status post surgery May 2018  7. History of sarcoidosis  8. Reflux " esophagitis    -3/8/2019: Initial visit with me.  2/15/2019 ferritin 686 with iron saturation 28% iron 90 and normal total iron binding capacity.  Has been drinking heavily by his own admission due to recent stressors.  Is due for neurosurgical intervention for cervical disc disease.  Has had a  colonoscopy with Dr. Larsen in the last few months.  Treats himself for reflux esophagitis as well.  I will check his CBC and CMP along with repeat ferritin and iron indices along with transferrin saturation and an HFE gene mutation.  I strongly suspect in light of the normal iron levels that the elevated ferritin is an acute phase reactant due to a multiplicity of possible inflammatory sources.    -3/8/2019 data: HFE gene mutation negative.  CBC normal.  Sedimentation rate and C-reactive protein normal.  Iron indices normal.  Liver enzymes normal.  Ferritin 717    -3/29/2019 visit: He is feeling fine with no specific complaints.  I am not sure why his ferritin was checked to begin with but the rest of his labs are unremarkable.  He had been drinking heavily due to stress and has said that has decreased.  Liver injury can cause elevation in iron and vice versa but I do not see elevation of his iron just the ferritin and that makes hemochromatosis unlikely with the negative HFE gene mutation.  I suggested he continue to stay sober and to follow-up with Dr. Larsen.    -8/21/2020 data: Ferritin 1235 with iron 86, total iron-binding capacity 265.  B12 414.  Folate 2.1.  Hemoglobin 12.1.  Creatinine improved to 1.32 from 2.38 on 8/17/2020.  AST 23 with ALT 21.  Complains of gynecomastia.    -11/2/2020 hematology follow-up visit: Ferritin not over 3000 making HLH or intense inflammatory response to sepsis unlikely and does not fit the clinical picture.  We will check transferrin saturation and if less than 45%, iron overload very unlikely especially with negative HFE gene mutation.  If transferrin saturation turns out to be over 45%  then, even with negative HFE gene mutation, I would still get MRI of liver or liver biopsy for liver iron quantitation though I think the odds of that is very unlikely.    Causes of high ferritin include:  A.  Intense inflammatory response with release of ferritin from necrotic cells such as sepsis, HLH, fulminant hepatic failure, hematologic malignancies, or stills disease but typically the ferritin is usually over 3000 mcg/L.  Hence this is unlikely  B.  Iron overload states such as hemochromatosis, transfusional iron overload, or ineffective erythropoietic syndrome such as thalassemia that cause increased iron absorption.  In these patients in men, the ferritin is usually over 200 to 300 mcg/L and are often over 8337-0367 mcg/L at the time of diagnosis.  He has never been anemic nor microcytic.  Hence, this is an unlikely cause  C.  Chronic liver disease such as hepatitis, HDEZ, or alcoholic liver disease and is usually associated with a ferritin of 300 to 1000 mcg/L without necessarily concomitant iron overload.  Though over thousand, with his alcohol history this is a possibility though his liver enzymes have been normal.  D.  Other chronic conditions such as anemia of chronic disease, metabolic syndrome (obesity, dyslipidemia, hypertension, insulin resistance) or hyperthyroidism.  Usually the ferritin is less than 2-3 times the upper limit of normal of 322 mcg/L.  Hence, with ferritin over thousand this would be unlikely.    Therefore, I will attempt to get MRI liver iron quantitation for completeness sake and ask him to follow-up as I have previously asked him to follow-up with gastroenterology, Dr. Larsen.  Having said all that, I strongly doubt he has hemochromatosis though I will repeat his ferritin, sedimentation rate, C-reactive protein, transferrin saturation, and iron indices and do a phone visit with him to go over all this.  Discussed with patient face-to-face 40 minutes greater than 50% spent  counseling regarding the above plan as outlined.          Stephen Clifton MD    11/02/2020

## 2020-11-18 ENCOUNTER — APPOINTMENT (OUTPATIENT)
Dept: MRI IMAGING | Facility: HOSPITAL | Age: 55
End: 2020-11-18

## 2020-11-19 ENCOUNTER — HOSPITAL ENCOUNTER (OUTPATIENT)
Dept: MRI IMAGING | Facility: HOSPITAL | Age: 55
Discharge: HOME | End: 2020-11-19
Admitting: INTERNAL MEDICINE

## 2020-11-19 DIAGNOSIS — R79.89 ELEVATED FERRITIN: ICD-10-CM

## 2020-11-19 PROCEDURE — 74181 MRI ABDOMEN W/O CONTRAST: CPT

## 2020-11-23 ENCOUNTER — OFFICE VISIT (OUTPATIENT)
Dept: ONCOLOGY | Facility: CLINIC | Age: 55
End: 2020-11-23

## 2020-11-23 VITALS — WEIGHT: 274 LBS | HEIGHT: 68 IN | BODY MASS INDEX: 41.52 KG/M2

## 2020-11-23 DIAGNOSIS — R79.89 ELEVATED FERRITIN: Primary | ICD-10-CM

## 2020-11-23 PROCEDURE — 99442 PR PHYS/QHP TELEPHONE EVALUATION 11-20 MIN: CPT | Performed by: INTERNAL MEDICINE

## 2020-11-23 NOTE — PROGRESS NOTES
Telehealth follow-up visit:  This visit has been rescheduled as a phone visit to comply with patient safety concerns in accordance with CDC recommendations. Total time of discussion was 15 minutes.  Verbal consent given    CHIEF COMPLAINT: Elevated ferritin likely due to Charles    Problem List:  Oncology/Hematology History Overview Note   1. Elevated ferritin  2. Diverticulitis with colovesical fistula status post surgery May 2018  3. History of sarcoidosis  4. Reflux esophagitis    -3/8/2019: Initial visit with me.  2/15/2019 ferritin 686 with iron saturation 28% iron 90 and normal total iron binding capacity.  Has been drinking heavily by his own admission due to recent stressors.  Is due for neurosurgical intervention for cervical disc disease.  Has had a  colonoscopy with Dr. Larsen in the last few months.  Treats himself for reflux esophagitis as well.  I will check his CBC and CMP along with repeat ferritin and iron indices along with transferrin saturation and an HFE gene mutation.  I strongly suspect in light of the normal iron levels that the elevated ferritin is an acute phase reactant due to a multiplicity of possible inflammatory sources.    -3/8/2019 data: HFE gene mutation negative.  CBC normal.  Sedimentation rate and C-reactive protein normal.  Iron indices normal.  Liver enzymes normal.  Ferritin 717    -3/29/2019 visit: He is feeling fine with no specific complaints.  I am not sure why his ferritin was checked to begin with but the rest of his labs are unremarkable.  He had been drinking heavily due to stress and has said that has decreased.  Liver injury can cause elevation in iron and vice versa but I do not see elevation of his iron just the ferritin and that makes hemochromatosis unlikely with the negative HFE gene mutation.  I suggested he continue to stay sober and to follow-up with Dr. Larsen.    -8/21/2020 data: Ferritin 1235 with iron 86, total iron-binding capacity 265.  B12 414.  Folate 2.1.   Hemoglobin 12.1.  Creatinine improved to 1.32 from 2.38 on 8/17/2020.  AST 23 with ALT 21.  Complains of gynecomastia.    -11/2/2020 hematology follow-up visit: Ferritin not over 3000 making HLH or intense inflammatory response to sepsis unlikely and does not fit the clinical picture.  We will check transferrin saturation and if less than 45%, iron overload very unlikely especially with negative HFE gene mutation.  If transferrin saturation turns out to be over 45% then, even with negative HFE gene mutation, I would still get MRI of liver or liver biopsy for liver iron quantitation though I think the odds of that is very unlikely.    Causes of high ferritin include:  A.  Intense inflammatory response with release of ferritin from necrotic cells such as sepsis, HLH, fulminant hepatic failure, hematologic malignancies, or stills disease but typically the ferritin is usually over 3000 mcg/L.  Hence this is unlikely  B.  Iron overload states such as hemochromatosis, transfusional iron overload, or ineffective erythropoietic syndrome such as thalassemia that cause increased iron absorption.  In these patients in men, the ferritin is usually over 200 to 300 mcg/L and are often over 2613-1297 mcg/L at the time of diagnosis.  He has never been anemic nor microcytic.  Hence, this is an unlikely cause  C.  Chronic liver disease such as hepatitis, HDEZ, or alcoholic liver disease and is usually associated with a ferritin of 300 to 1000 mcg/L without necessarily concomitant iron overload.  Though over thousand, with his alcohol history this is a possibility though his liver enzymes have been normal.  D.  Other chronic conditions such as anemia of chronic disease, metabolic syndrome (obesity, dyslipidemia, hypertension, insulin resistance) or hyperthyroidism.  Usually the ferritin is less than 2-3 times the upper limit of normal of 322 mcg/L.  Hence, if ferritin consistently well over a thousand this would be unlikely, but with  repeat ferritins generally less than 1000 this is still distinctly possible.    Therefore, I will attempt to get MRI liver iron quantitation for completeness sake and ask him to follow-up as I have previously asked him to follow-up with gastroenterology, Dr. Larsen.  Having said all that, I strongly doubt he has hemochromatosis though I will repeat his ferritin, sedimentation rate, C-reactive protein, transferrin saturation, and iron indices and do a phone visit with him to go over all this.    -11/2/2020 data: CBC entirely normal with hemoglobin 13.8.  White count normal.  C-reactive protein slightly elevated 0.69 with sedimentation rate just above normal at 23 and ferritin of 943.9 with low iron 50 and low iron saturation 13% with normal total iron-binding capacity 380 and normal transferrin of 255 making iron overload exceedingly unlikely.  CMP unremarkable including liver enzymes midrange normal.    -11/19/2020 MRI liver iron protocol showed no iron overload in the liver.  Some hepatic steatosis and slight splenic iron overload in the spleen likely inconsequential.    -11/23/2020 [facility] follow-up visit: Ferritin is staying generally below 1000 and with low iron and transferrin this is virtually certainly not hemochromatosis or iron overload but, despite the fact the sedimentation rate and C-reactive protein are only modestly elevated, I expect this is reactive in nature and I would do nothing further.  See above for the differential diagnosis I have previously outlined.I suspect that the combination of significant alcohol exposure along with fatty liver disease is the main culprit and there is not much else for me to add other than to ask him to follow-up with Dr. Larsen relative to these issues.  I would not phlebotomize him for this nor do further hematologic work-up.  This is not hemochromatosis.     Elevated ferritin   3/8/2019 Initial Diagnosis    Other hemochromatosis         HISTORY OF PRESENT  "ILLNESS:  The patient is a 55 y.o. male, here for follow up on management of elevated ferritin likely due to Charles      Past Medical History:   Diagnosis Date   • Anemia    • Arthritis    • Asthma    • Colonic fistula    • Hypertension      Past Surgical History:   Procedure Laterality Date   • APPENDECTOMY  1981   • COLON RESECTION     • KNEE ACL RECONSTRUCTION     • LAMINECTOMY      15 years.        No Known Allergies    Family History and Social History reviewed and changed as necessary      REVIEW OF SYSTEM:   Review of Systems   Constitutional: Negative for appetite change, chills, diaphoresis, fatigue, fever and unexpected weight change.   HENT:   Negative for mouth sores, sore throat and trouble swallowing.    Eyes: Negative for icterus.   Respiratory: Negative for cough, hemoptysis and shortness of breath.    Cardiovascular: Negative for chest pain, leg swelling and palpitations.   Gastrointestinal: Negative for abdominal distention, abdominal pain, blood in stool, constipation, diarrhea, nausea and vomiting.   Endocrine: Negative for hot flashes.   Genitourinary: Negative for bladder incontinence, difficulty urinating, dysuria, frequency and hematuria.    Musculoskeletal: Negative for gait problem, neck pain and neck stiffness.   Skin: Negative for rash.   Neurological: Negative for dizziness, gait problem, headaches, light-headedness and numbness.   Hematological: Negative for adenopathy. Does not bruise/bleed easily.   Psychiatric/Behavioral: Negative for depression. The patient is not nervous/anxious.    All other systems reviewed and are negative.       PHYSICAL EXAM    Vitals:    11/23/20 0957   Weight: 124 kg (274 lb)   Height: 172.7 cm (68\")     Vitals:    11/23/20 0957   PainSc: 0-No pain        Phone visit      Lab Results   Component Value Date    HGB 13.8 11/02/2020    HCT 41.9 11/02/2020    MCV 86.0 11/02/2020     11/02/2020    WBC 7.40 11/02/2020    NEUTROABS 5.10 11/02/2020    LYMPHSABS " 1.70 11/02/2020    MONOSABS 0.60 11/02/2020       Lab Results   Component Value Date    GLUCOSE 112 (H) 11/02/2020    BUN 15 11/02/2020    CREATININE 0.98 11/02/2020     11/02/2020    K 4.3 11/02/2020    CL 98 11/02/2020    CO2 26.0 11/02/2020    CALCIUM 9.8 11/02/2020    PROTEINTOT 7.5 11/02/2020    ALBUMIN 4.20 11/02/2020    BILITOT 0.3 11/02/2020    ALKPHOS 81 11/02/2020    AST 29 11/02/2020    ALT 22 11/02/2020                   ASSESSMENT & PLAN:  1. Elevated ferritin likely due to Charles  2. Diverticulitis with colovesical fistula status post surgery May 2018  3. History of sarcoidosis  4. Reflux esophagitis    -3/8/2019: Initial visit with me.  2/15/2019 ferritin 686 with iron saturation 28% iron 90 and normal total iron binding capacity.  Has been drinking heavily by his own admission due to recent stressors.  Is due for neurosurgical intervention for cervical disc disease.  Has had a  colonoscopy with Dr. Larsen in the last few months.  Treats himself for reflux esophagitis as well.  I will check his CBC and CMP along with repeat ferritin and iron indices along with transferrin saturation and an HFE gene mutation.  I strongly suspect in light of the normal iron levels that the elevated ferritin is an acute phase reactant due to a multiplicity of possible inflammatory sources.    -3/8/2019 data: HFE gene mutation negative.  CBC normal.  Sedimentation rate and C-reactive protein normal.  Iron indices normal.  Liver enzymes normal.  Ferritin 717    -3/29/2019 visit: He is feeling fine with no specific complaints.  I am not sure why his ferritin was checked to begin with but the rest of his labs are unremarkable.  He had been drinking heavily due to stress and has said that has decreased.  Liver injury can cause elevation in iron and vice versa but I do not see elevation of his iron just the ferritin and that makes hemochromatosis unlikely with the negative HFE gene mutation.  I suggested he continue to stay  sober and to follow-up with Dr. Larsen.    -8/21/2020 data: Ferritin 1235 with iron 86, total iron-binding capacity 265.  B12 414.  Folate 2.1.  Hemoglobin 12.1.  Creatinine improved to 1.32 from 2.38 on 8/17/2020.  AST 23 with ALT 21.  Complains of gynecomastia.    -11/2/2020 hematology follow-up visit: Ferritin not over 3000 making HLH or intense inflammatory response to sepsis unlikely and does not fit the clinical picture.  We will check transferrin saturation and if less than 45%, iron overload very unlikely especially with negative HFE gene mutation.  If transferrin saturation turns out to be over 45% then, even with negative HFE gene mutation, I would still get MRI of liver or liver biopsy for liver iron quantitation though I think the odds of that is very unlikely.    Causes of high ferritin include:  A.  Intense inflammatory response with release of ferritin from necrotic cells such as sepsis, HLH, fulminant hepatic failure, hematologic malignancies, or stills disease but typically the ferritin is usually over 3000 mcg/L.  Hence this is unlikely  B.  Iron overload states such as hemochromatosis, transfusional iron overload, or ineffective erythropoietic syndrome such as thalassemia that cause increased iron absorption.  In these patients in men, the ferritin is usually over 200 to 300 mcg/L and are often over 2919-0862 mcg/L at the time of diagnosis.  He has never been anemic nor microcytic.  Hence, this is an unlikely cause  C.  Chronic liver disease such as hepatitis, HDEZ, or alcoholic liver disease and is usually associated with a ferritin of 300 to 1000 mcg/L without necessarily concomitant iron overload.  Though over thousand, with his alcohol history this is a possibility though his liver enzymes have been normal.  D.  Other chronic conditions such as anemia of chronic disease, metabolic syndrome (obesity, dyslipidemia, hypertension, insulin resistance) or hyperthyroidism.  Usually the ferritin is  less than 2-3 times the upper limit of normal of 322 mcg/L.  Hence, if ferritin consistently well over a thousand this would be unlikely, but with repeat ferritins generally less than 1000 this is still distinctly possible.    Therefore, I will attempt to get MRI liver iron quantitation for completeness sake and ask him to follow-up as I have previously asked him to follow-up with gastroenterology, Dr. Larsen.  Having said all that, I strongly doubt he has hemochromatosis though I will repeat his ferritin, sedimentation rate, C-reactive protein, transferrin saturation, and iron indices and do a phone visit with him to go over all this.    -11/2/2020 data: CBC entirely normal with hemoglobin 13.8.  White count normal.  C-reactive protein slightly elevated 0.69 with sedimentation rate just above normal at 23 and ferritin of 943.9 with low iron 50 and low iron saturation 13% with normal total iron-binding capacity 380 and normal transferrin of 255 making iron overload exceedingly unlikely.  CMP unremarkable including liver enzymes midrange normal.    -11/19/2020 MRI liver iron protocol showed no iron overload in the liver.  Some hepatic steatosis and slight splenic iron overload in the spleen likely inconsequential.    -11/23/2020 [facility] hematology follow-up visit: Ferritin is staying generally below 1000 and with low iron and transferrin this is virtually certainly not hemochromatosis or iron overload but, despite the fact the sedimentation rate and C-reactive protein are only modestly elevated, I expect this is reactive in nature and I would do nothing further.  See above for the differential diagnosis I have previously outlined.I suspect that the combination of significant alcohol exposure along with fatty liver disease is the main culprit and there is not much else for me to add other than to ask him to follow-up with Dr. Larsen relative to these issues.  I would not phlebotomize him for this nor do further  hematologic work-up.  This is not hemochromatosis.  I will see him on an as-needed basis.        Stephen Clifton MD

## 2022-04-07 ENCOUNTER — PATIENT ROUNDING (BHMG ONLY) (OUTPATIENT)
Dept: ORTHOPEDIC SURGERY | Facility: CLINIC | Age: 57
End: 2022-04-07

## 2022-04-07 ENCOUNTER — OFFICE VISIT (OUTPATIENT)
Dept: ORTHOPEDIC SURGERY | Facility: CLINIC | Age: 57
End: 2022-04-07

## 2022-04-07 VITALS
BODY MASS INDEX: 41.37 KG/M2 | DIASTOLIC BLOOD PRESSURE: 90 MMHG | HEIGHT: 68 IN | SYSTOLIC BLOOD PRESSURE: 150 MMHG | WEIGHT: 273 LBS

## 2022-04-07 DIAGNOSIS — M75.01 ADHESIVE CAPSULITIS OF RIGHT SHOULDER: ICD-10-CM

## 2022-04-07 DIAGNOSIS — M75.21 BICEPS TENDINITIS OF RIGHT UPPER EXTREMITY: ICD-10-CM

## 2022-04-07 DIAGNOSIS — M75.00 DIABETIC FROZEN SHOULDER ASSOCIATED WITH TYPE 2 DIABETES MELLITUS: Primary | ICD-10-CM

## 2022-04-07 DIAGNOSIS — M75.41 IMPINGEMENT SYNDROME OF RIGHT SHOULDER: ICD-10-CM

## 2022-04-07 DIAGNOSIS — M75.51 BURSITIS OF RIGHT SHOULDER: ICD-10-CM

## 2022-04-07 DIAGNOSIS — E11.618 DIABETIC FROZEN SHOULDER ASSOCIATED WITH TYPE 2 DIABETES MELLITUS: Primary | ICD-10-CM

## 2022-04-07 DIAGNOSIS — M25.511 RIGHT SHOULDER PAIN, UNSPECIFIED CHRONICITY: ICD-10-CM

## 2022-04-07 PROCEDURE — 99204 OFFICE O/P NEW MOD 45 MIN: CPT | Performed by: ORTHOPAEDIC SURGERY

## 2022-04-07 RX ORDER — CLONIDINE HYDROCHLORIDE 0.2 MG/1
TABLET ORAL EVERY 8 HOURS
COMMUNITY

## 2022-04-07 RX ORDER — AMLODIPINE BESYLATE 10 MG/1
10 TABLET ORAL DAILY
COMMUNITY
End: 2022-05-26

## 2022-04-07 RX ORDER — PRAVASTATIN SODIUM 20 MG
20 TABLET ORAL DAILY
COMMUNITY

## 2022-04-07 RX ORDER — HYDROCHLOROTHIAZIDE 12.5 MG/1
12.5 TABLET ORAL DAILY
COMMUNITY

## 2022-04-07 RX ORDER — ATENOLOL 100 MG/1
100 TABLET ORAL DAILY
COMMUNITY

## 2022-04-07 RX ORDER — FOLIC ACID 1 MG/1
TABLET ORAL DAILY
COMMUNITY

## 2022-04-07 RX ORDER — ALBUTEROL SULFATE 90 UG/1
2 AEROSOL, METERED RESPIRATORY (INHALATION) EVERY 6 HOURS PRN
COMMUNITY

## 2022-04-07 NOTE — PROGRESS NOTES
"                                                                    [facility] Orthopaedic Surgery Office Visit - Rob Gallardo MD    Office Visit       Patient Name: Aaron Esparza    Chief Complaint:   Chief Complaint   Patient presents with   • Right Shoulder - Edema       Referring Physician: Matias Prater MD -I appreciate the referral    History of Present Illness:   Aaron Esparza is a 57 y.o. male who presents with right body part: shoulder Reason: pain.  Onset:Onset: mechanical fall. The issue has been ongoing for 4 month(s). Pain is a 9/10 on the pain scale. Pain is described as Pain Characterization: aching, burning, throbbing and shooting. Associated symptoms include Symptoms: stiffness. The pain is worse with leisure; nothing improve the pain. Previous treatments have included: nothing.  I have reviewed the patient's history of present illness as noted/entered above.    I have reviewed the patient's past medical history, surgical history, social history, family history, medications, and allergies as noted in the electronic medical record and as noted/entered.  I have reviewed the patient's review of systems as noted/enter and updated as noted in the patient's HPI.    Right shoulder pain after a fall now reportedly 4 months ago.  The pain is 9 out of 10 aching burning throbbing shooting pain and stiffness    Occupation:  [employer], [employer], [employer]    Right shoulder \"popped out of socket \"20 years ago  Most recent injury was in November 2021    History of Diabetes  Plaquinil -- Sarcoidosis  BMI 41.5    Pain and weakness and stiffness    Subjective   Subjective      Review of Systems   Constitutional: Positive for fatigue. Negative for chills and fever.   HENT: Negative.  Negative for congestion and dental problem.    Eyes: Negative.  Negative for blurred vision.   Respiratory: Negative.  Negative for shortness of breath.    Cardiovascular: Negative.  Negative for leg swelling. "   Gastrointestinal: Negative.  Negative for abdominal pain.   Endocrine: Negative.  Negative for polyuria.   Genitourinary: Negative.  Negative for difficulty urinating.   Musculoskeletal: Positive for arthralgias.   Skin: Negative.    Allergic/Immunologic: Negative.    Neurological: Negative.    Hematological: Negative.  Negative for adenopathy.   Psychiatric/Behavioral: Negative.  Negative for behavioral problems.        Past Medical History:   Past Medical History:   Diagnosis Date   • Anemia    • Arthritis    • Asthma    • Back problem    • Colonic fistula    • Diabetes (HCC)    • Gout    • Hypertension        Past Surgical History:   Past Surgical History:   Procedure Laterality Date   • APPENDECTOMY  1981   • COLON RESECTION     • KNEE ACL RECONSTRUCTION     • LAMINECTOMY      15 years.        Family History:   Family History   Problem Relation Age of Onset   • Cancer Mother    • Hypertension Mother    • Hypertension Father    • Heart disease Father    • Alzheimer's disease Maternal Grandmother    • Heart block Maternal Grandmother    • Heart block Maternal Grandfather    • Diabetes Paternal Grandmother    • Heart attack Paternal Grandfather        Social History:   Social History     Socioeconomic History   • Marital status:    • Number of children: 2   Tobacco Use   • Smoking status: Current Some Day Smoker   • Smokeless tobacco: Never Used   Substance and Sexual Activity   • Alcohol use: Yes   • Drug use: Not Currently     Types: Marijuana   • Sexual activity: Defer       Medications:   Current Outpatient Medications:   •  albuterol sulfate  (90 Base) MCG/ACT inhaler, Inhale 2 puffs Every 6 (Six) Hours As Needed., Disp: , Rfl:   •  amLODIPine (NORVASC) 10 MG tablet, Take 10 mg by mouth., Disp: , Rfl:   •  amLODIPine (NORVASC) 10 MG tablet, Take 10 mg by mouth Daily., Disp: , Rfl:   •  amoxicillin-clavulanate (AUGMENTIN) 875-125 MG per tablet, Take 1 tablet by mouth 2 (Two) Times a Day., Disp:  "20 tablet, Rfl: 0  •  aspirin 81 MG chewable tablet, Chew 81 mg., Disp: , Rfl:   •  atenolol (TENORMIN) 100 MG tablet, Take 100 mg by mouth Daily., Disp: , Rfl:   •  cloNIDine (CATAPRES) 0.2 MG tablet, Take  by mouth Every 8 (Eight) Hours., Disp: , Rfl:   •  ferrous sulfate 325 (65 FE) MG tablet, Take 325 mg by mouth., Disp: , Rfl:   •  folic acid (FOLVITE) 1 MG tablet, Take  by mouth Daily., Disp: , Rfl:   •  hydroCHLOROthiazide (HYDRODIURIL) 12.5 MG tablet, Take 12.5 mg by mouth Daily., Disp: , Rfl:   •  hydroxychloroquine (PLAQUENIL) 200 MG tablet, Take 200 mg by mouth., Disp: , Rfl:   •  lisinopril-hydrochlorothiazide (PRINZIDE,ZESTORETIC) 20-12.5 MG per tablet, Take 1 tablet by mouth., Disp: , Rfl:   •  loperamide (IMODIUM) 2 MG capsule, Take 1 capsule by mouth 4 (Four) Times a Day As Needed for Diarrhea., Disp: 10 capsule, Rfl: 0  •  metoprolol succinate XL (TOPROL-XL) 50 MG 24 hr tablet, Take 100 mg by mouth., Disp: , Rfl:   •  omeprazole (priLOSEC) 20 MG capsule, Take 20 mg by mouth., Disp: , Rfl:   •  ondansetron ODT (ZOFRAN-ODT) 4 MG disintegrating tablet, Place 1 tablet on the tongue Every 8 (Eight) Hours As Needed for Nausea or Vomiting for up to 6 doses., Disp: 6 tablet, Rfl: 0  •  pravastatin (PRAVACHOL) 20 MG tablet, Take 20 mg by mouth Daily., Disp: , Rfl:     Allergies: No Known Allergies    The following portions of the patient's history were reviewed and updated as appropriate: allergies, current medications, past family history, past medical history, past social history, past surgical history and problem list.        Objective    Objective      Vital Signs:   Vitals:    04/07/22 1027   BP: 150/90   Weight: 124 kg (273 lb)   Height: 172.7 cm (67.99\")       Ortho Exam:  General: no acute distress, comfortable  Vitals reviewed in chart    Musculoskeletal Exam      Subjective edema but I not appreciate edema on my clinical exam.  Evidence of diabetic frozen shoulder and concern for rotator cuff " pathology    SIDE: Right  Shoulder Exam:  Range of motion measurements (degrees)  Forward flexion/Abduction/External rotation at side/ER at 90/IR at 90/IR position  Active: 130/130/30/70/40/buttock  Passive: 130/130/40/70/40/buttock    Diminished internal rotation and external rotation  Painful arc of motion  No evidence of septic joint  Pain with forward flexion and abduction greater than 70  Impingement testing Neer's test - positive/painful  Impingement testing Hawkin's test - positive/painful  Rotator cuff testing Driss's test -pain and weakness with Jobes testing  Rotator cuff testing External rotation - no weakness  Rotator cuff testing Lag signs - absent  Rotator cuff testing Belly press - negative  Scapular dyskinesis - present, abnormal scapular motion      Results Review:   Imaging Results (Last 24 Hours)     Procedure Component Value Units Date/Time    XR Shoulder 2+ View Right [[id number]] Resulted: 04/07/22 1117     Updated: 04/07/22 1117    Narrative:      Imaging: shoulder x-rays 2 views - AP and axillary x-ray views    Side: RIGHT    Indication for shoulder x-ray 2 views: shoulder pain    Comparison: no comparison views available    Findings: No acute bony pathology. No superior humeral head migration.    The humeral head remains centered in the glenohumeral joint. No evidence   of calcific tendonitis.    Chronic subchondral changes at the greater tuberosity with subchondral   cystic changes noted.  Degenerative AC joint arthritic findings noted.    I personally reviewed the above x-rays and discussed with the patient.            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 (HCC) - Primary    Relevant Orders    MRI Shoulder Right Without Contrast    Ambulatory Referral to Physical Therapy Ortho, Evaluate and treat    Biceps tendinitis of  right upper extremity    Relevant Orders    MRI Shoulder Right Without Contrast    Ambulatory Referral to Physical Therapy Ortho, Evaluate and treat    Adhesive capsulitis of right shoulder    Relevant Orders    MRI Shoulder Right Without Contrast    Ambulatory Referral to Physical Therapy Ortho, Evaluate and treat    Bursitis of right shoulder    Relevant Orders    MRI Shoulder Right Without Contrast    Ambulatory Referral to Physical Therapy Ortho, Evaluate and treat    Impingement syndrome of right shoulder    Relevant Orders    MRI Shoulder Right Without Contrast    Ambulatory Referral to Physical Therapy Ortho, Evaluate and treat    Right shoulder pain    Relevant Orders    XR Shoulder 2+ View Right (Completed)    MRI Shoulder Right Without Contrast    Ambulatory Referral to Physical Therapy Ortho, Evaluate and treat          RIGHT SHOULDER    MRI of the shoulder is recommended.  Indication: suspected rotator cuff tear  The MRI is critical to evaluate for rotator cuff tearing and will help with possible surgical planning.      Counseled on the combination of diabetic frozen shoulder and suspected rotator cuff tearing.  Complex combination but we would like to start with an MRI of the shoulder to evaluate for suspected rotator cuff tearing ultimately we will need conservative course to get the frozen shoulder part better we will going get him plugged into physical therapy as well.    Follow Up: After right shoulder MRI        Rob Gallardo MD, FAAOS  Orthopedic Surgeon  Fellowship Trained Shoulder and Elbow Surgeon  [facility]  Orthopedics and Sports Medicine  [address] [place] [address], [address]  [place] [address]    04/07/22  11:28 EDT

## 2022-04-07 NOTE — PROGRESS NOTES
April 7, 2022    Hello, may I speak with Aaron Esparza?    My name is Roseanne      I am  with [facility]  [address]  [place] [address].    Before we get started may I verify your date of birth? 1965    I am calling to officially welcome you to our practice and ask about your recent visit. Is this a good time to talk? No. Mr. Esparza was at work when I called him so was not able to provide feedback.        Thank you, and have a great day.

## 2022-04-24 ENCOUNTER — HOSPITAL ENCOUNTER (OUTPATIENT)
Dept: MRI IMAGING | Facility: HOSPITAL | Age: 57
Discharge: HOME | End: 2022-04-24
Admitting: ORTHOPAEDIC SURGERY

## 2022-04-24 DIAGNOSIS — M75.00 DIABETIC FROZEN SHOULDER ASSOCIATED WITH TYPE 2 DIABETES MELLITUS: ICD-10-CM

## 2022-04-24 DIAGNOSIS — E11.618 DIABETIC FROZEN SHOULDER ASSOCIATED WITH TYPE 2 DIABETES MELLITUS: ICD-10-CM

## 2022-04-24 DIAGNOSIS — M75.51 BURSITIS OF RIGHT SHOULDER: ICD-10-CM

## 2022-04-24 DIAGNOSIS — M75.01 ADHESIVE CAPSULITIS OF RIGHT SHOULDER: ICD-10-CM

## 2022-04-24 DIAGNOSIS — M75.21 BICEPS TENDINITIS OF RIGHT UPPER EXTREMITY: ICD-10-CM

## 2022-04-24 DIAGNOSIS — M25.511 RIGHT SHOULDER PAIN, UNSPECIFIED CHRONICITY: ICD-10-CM

## 2022-04-24 DIAGNOSIS — M75.41 IMPINGEMENT SYNDROME OF RIGHT SHOULDER: ICD-10-CM

## 2022-04-24 PROCEDURE — 73221 MRI JOINT UPR EXTREM W/O DYE: CPT

## 2022-04-29 ENCOUNTER — TELEPHONE (OUTPATIENT)
Dept: ORTHOPEDIC SURGERY | Facility: CLINIC | Age: 57
End: 2022-04-29

## 2022-04-29 NOTE — TELEPHONE ENCOUNTER
Relayed these results to patient, he understood, and will follow up on 5/5/22 to discuss these results in person and do a re-examination.

## 2022-04-29 NOTE — TELEPHONE ENCOUNTER
----- Message from Rob Gallardo MD sent at 4/27/2022  1:44 PM EDT -----  Regarding: MRI results  Patient has significant chronic rotator cuff tearing.  I would be happy to go over this with him in person.  I do not see that he has a follow-up appointment on the books.  ----- Message -----  From: [facility] [place] [facility]  Sent: 4/25/2022   9:21 AM EDT  To: Rob Gallardo MD

## 2022-05-05 ENCOUNTER — OFFICE VISIT (OUTPATIENT)
Dept: ORTHOPEDIC SURGERY | Facility: CLINIC | Age: 57
End: 2022-05-05

## 2022-05-05 ENCOUNTER — TELEPHONE (OUTPATIENT)
Dept: ORTHOPEDIC SURGERY | Facility: CLINIC | Age: 57
End: 2022-05-05

## 2022-05-05 VITALS
SYSTOLIC BLOOD PRESSURE: 140 MMHG | WEIGHT: 267 LBS | BODY MASS INDEX: 40.47 KG/M2 | DIASTOLIC BLOOD PRESSURE: 98 MMHG | HEIGHT: 68 IN

## 2022-05-05 DIAGNOSIS — E11.618 DIABETIC FROZEN SHOULDER ASSOCIATED WITH TYPE 2 DIABETES MELLITUS: ICD-10-CM

## 2022-05-05 DIAGNOSIS — M75.41 IMPINGEMENT SYNDROME OF RIGHT SHOULDER: ICD-10-CM

## 2022-05-05 DIAGNOSIS — M75.51 BURSITIS OF RIGHT SHOULDER: ICD-10-CM

## 2022-05-05 DIAGNOSIS — M75.21 BICEPS TENDINITIS OF RIGHT UPPER EXTREMITY: ICD-10-CM

## 2022-05-05 DIAGNOSIS — M25.511 RIGHT SHOULDER PAIN, UNSPECIFIED CHRONICITY: ICD-10-CM

## 2022-05-05 DIAGNOSIS — M75.01 ADHESIVE CAPSULITIS OF RIGHT SHOULDER: ICD-10-CM

## 2022-05-05 DIAGNOSIS — M75.121 NONTRAUMATIC COMPLETE TEAR OF RIGHT ROTATOR CUFF: Primary | ICD-10-CM

## 2022-05-05 DIAGNOSIS — M75.00 DIABETIC FROZEN SHOULDER ASSOCIATED WITH TYPE 2 DIABETES MELLITUS: ICD-10-CM

## 2022-05-05 PROCEDURE — 99214 OFFICE O/P EST MOD 30 MIN: CPT | Performed by: ORTHOPAEDIC SURGERY

## 2022-05-05 RX ORDER — METHOCARBAMOL 500 MG/1
500 TABLET, FILM COATED ORAL 3 TIMES DAILY PRN
Qty: 40 TABLET | Refills: 1 | Status: SHIPPED | OUTPATIENT
Start: 2022-05-05 | End: 2022-05-19

## 2022-05-05 NOTE — TELEPHONE ENCOUNTER
Patient was in office earlier today and forgot to ask if he would be able to get a sling or something for the right shoulder to help restrict movement.

## 2022-05-05 NOTE — PROGRESS NOTES
"                                                                [facility] [place] [facility] [place] Up       Office Follow Up Visit       Patient Name: Aaron Esparza    Chief Complaint:   Chief Complaint   Patient presents with   • Follow-up     1 month MRI (4/24/22) recheck - Diabetic Right frozen shoulder associated with type 2 diabetes mellitus        Referring Physician: No ref. provider found    History of Present Illness:   It has been 1  month(s) since Aaron Esparza's last visit. Aaron Esparza returns to clinic today for F/U: follow-up of rightBody Part: shoulderReason: pain. The issue has been ongoing for 4 month(s). Aaron Esparza rates HIS/HER: hispain at 10/10 on the pain scale. Previous/current treatments: nothing. Current symptoms:Symptoms: pain and stiffness. The pain is worse with sleeping, lying on affected side and any movement of the joint; sitting improves the pain. Overall, he/she: heis doing better.  I have reviewed the patient's history of present illness as noted/entered above.    I have reviewed the patient's past medical history, surgical history, social history, family history, medications, and allergies as noted in the electronic medical record and as noted/entered.  I have reviewed the patient's review of systems as noted/enter and updated as noted in the patient's HPI.    Right shoulder pain after a fall now reportedly 4 months ago.  The pain is 9 out of 10 aching burning throbbing shooting pain and stiffness     Occupation: [employer], [employer], [employer]     Right shoulder \"popped out of socket \"20 years ago  Most recent injury was in November 2021     History of Diabetes  Plaquinil -- Sarcoidosis  BMI 41.5     Pain and weakness and stiffness    5/5/2022:  Right shoulder pain persists he rates the pain as 10 of 10 affected with any movement.  He reports some subjective improvements despite continued rating the pain as 10 out of " "10.  He would like a referral to see a knee physician  He asked with regards to getting something for pain.  \"I get by with it.\" Making some gains -- significant pathology      Subjective   Subjective      Review of Systems   Constitutional: Negative.  Negative for chills, fatigue and fever.   HENT: Negative.  Negative for congestion and dental problem.    Eyes: Negative.  Negative for blurred vision.   Respiratory: Positive for wheezing. Negative for shortness of breath.    Cardiovascular: Negative.  Negative for leg swelling.   Gastrointestinal: Negative.  Negative for abdominal pain.   Endocrine: Negative.  Negative for polyuria.   Genitourinary: Negative.  Negative for difficulty urinating.   Musculoskeletal: Positive for arthralgias.   Skin: Negative.    Allergic/Immunologic: Negative.    Neurological: Negative.    Hematological: Negative.  Negative for adenopathy.   Psychiatric/Behavioral: Negative.  Negative for behavioral problems.        Past Medical History:   Past Medical History:   Diagnosis Date   • Anemia    • Arthritis    • Asthma    • Back problem    • Colonic fistula    • Diabetes (HCC)    • Gout    • Hypertension        Past Surgical History:   Past Surgical History:   Procedure Laterality Date   • APPENDECTOMY  1981   • COLON RESECTION     • KNEE ACL RECONSTRUCTION     • LAMINECTOMY      14 years.        Family History:   Family History   Problem Relation Age of Onset   • Cancer Mother    • Hypertension Mother    • Hypertension Father    • Heart disease Father    • Alzheimer's disease Maternal Grandmother    • Heart block Maternal Grandmother    • Heart block Maternal Grandfather    • Diabetes Paternal Grandmother    • Heart attack Paternal Grandfather        Social History:   Social History     Socioeconomic History   • Marital status:    • Number of children: 2   Tobacco Use   • Smoking status: Current Some Day Smoker   • Smokeless tobacco: Never Used   Substance and Sexual Activity   • " Alcohol use: Yes   • Drug use: Not Currently     Types: Marijuana   • Sexual activity: Defer       Medications:   Current Outpatient Medications:   •  albuterol sulfate  (90 Base) MCG/ACT inhaler, Inhale 2 puffs Every 6 (Six) Hours As Needed., Disp: , Rfl:   •  amLODIPine (NORVASC) 10 MG tablet, Take 10 mg by mouth., Disp: , Rfl:   •  amLODIPine (NORVASC) 10 MG tablet, Take 10 mg by mouth Daily., Disp: , Rfl:   •  amoxicillin-clavulanate (AUGMENTIN) 875-125 MG per tablet, Take 1 tablet by mouth 2 (Two) Times a Day., Disp: 20 tablet, Rfl: 0  •  aspirin 81 MG chewable tablet, Chew 81 mg., Disp: , Rfl:   •  atenolol (TENORMIN) 100 MG tablet, Take 100 mg by mouth Daily., Disp: , Rfl:   •  cloNIDine (CATAPRES) 0.2 MG tablet, Take  by mouth Every 8 (Eight) Hours., Disp: , Rfl:   •  ferrous sulfate 325 (65 FE) MG tablet, Take 325 mg by mouth., Disp: , Rfl:   •  folic acid (FOLVITE) 1 MG tablet, Take  by mouth Daily., Disp: , Rfl:   •  hydroCHLOROthiazide (HYDRODIURIL) 12.5 MG tablet, Take 12.5 mg by mouth Daily., Disp: , Rfl:   •  hydroxychloroquine (PLAQUENIL) 200 MG tablet, Take 200 mg by mouth., Disp: , Rfl:   •  lisinopril-hydrochlorothiazide (PRINZIDE,ZESTORETIC) 20-12.5 MG per tablet, Take 1 tablet by mouth., Disp: , Rfl:   •  loperamide (IMODIUM) 2 MG capsule, Take 1 capsule by mouth 4 (Four) Times a Day As Needed for Diarrhea., Disp: 10 capsule, Rfl: 0  •  methocarbamol (ROBAXIN) 500 MG tablet, Take 1 tablet by mouth 3 (Three) Times a Day As Needed for Muscle Spasms for up to 14 days., Disp: 40 tablet, Rfl: 1  •  metoprolol succinate XL (TOPROL-XL) 50 MG 24 hr tablet, Take 100 mg by mouth., Disp: , Rfl:   •  omeprazole (priLOSEC) 20 MG capsule, Take 20 mg by mouth., Disp: , Rfl:   •  ondansetron ODT (ZOFRAN-ODT) 4 MG disintegrating tablet, Place 1 tablet on the tongue Every 8 (Eight) Hours As Needed for Nausea or Vomiting for up to 6 doses., Disp: 6 tablet, Rfl: 0  •  pravastatin (PRAVACHOL) 20 MG tablet,  "Take 20 mg by mouth Daily., Disp: , Rfl:     Allergies: No Known Allergies    The following portions of the patient's history were reviewed and updated as appropriate: allergies, current medications, past family history, past medical history, past social history, past surgical history and problem list.        Objective    Objective      Vital Signs:   Vitals:    05/05/22 1123   BP: 140/98   Weight: 121 kg (267 lb)   Height: 172.7 cm (67.99\")       Ortho Exam:  RIGHT     General: no acute distress, comfortable  Vitals reviewed in chart    Musculoskeletal Exam    SIDE: Right shoulder  Shoulder Exam:    Tenderness: rotator cuff    Range of motion measurements (degrees)  Forward flexion/Abduction/External rotation at side/ER at 90/IR at 90/IR position  Active: Quite stoic on clinical exam 130/130/30/80/40  Passive: Appears to be less difficulty with stiffness today, rotator cuff pathology noted    Painful arc of motion: yes  No evidence of septic joint  Pain with forward flexion and abduction greater: 90 degrees  Impingement testing Neer's test - positive/painful  Impingement testing Hawkin's test - positive/painful    Rotator Cuff Testing:  Tenderness to palpation at rotator cuff - YES  Rotator cuff testing Driss's test - positive  Rotator cuff testing External rotation - no  Rotator cuff testing Lag signs - no  Rotator cuff testing Belly press - good strength the belly press despite known pathology superior portion  Pain with abduction great than 90 degrees - yes    Scapular dyskinesis - present, abnormal scapular motion    Long head of the biceps testing:  Corrigan's test for biceps & Speed's test - negative despite pathology on MRI  Bicipital groove tenderness to palpation/tenderness to palpation of biceps tendon - mild        Results Review:  Imaging Results (Last 24 Hours)     ** No results found for the last 24 hours. **          MRI Shoulder Right Without Contrast    Result Date: 4/25/2022  1.  Large full-thickness " tears involving the entirety of the supraspinatus and infraspinatus components of the rotator cuff. 2.  There is also full-thickness tear involving the distal superior fibers of the subscapularis component of the cuff. High-grade partial thickness undersurface and intrasubstance tear is noted involving the remaining middle to inferior fibers of the subscapularis component. 3.  Evidence for severe changes of tendinopathy involving the proximal biceps tendon. There may be superimposed partial-thickness longitudinal split tear. There does not appear to be complete disruption. The tendon is noted in the expected position in the intertubercular sulcus. 4.  Evidence for a SLAP type III or IV tear. 5.  Mild changes of osteoarthritis are noted involving the glenohumeral joint. Moderate hypertrophic age-related changes of the acromioclavicular joint are noted. 6.  A joint effusion is noted with heterogeneous internal signal suggesting potential chronic synovial changes related to arthropathy. There may also be a component of hemarthrosis.  This report was finalized on 4/25/2022 9:17 AM by Tino Carranza MD.        I personally interpreted the images above he has massive irreparable supra and infraspinatus tears stage IV fatty infiltration.  He has upper border tearing of the subscapularis we reviewed this together.  Long head of the biceps is intact with tendinopathy.  Chronic AC joint changes.  SLAP tearing noted.  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 (HCC)    Relevant Medications    methocarbamol (ROBAXIN) 500 MG tablet    Other Relevant Orders    Ambulatory Referral to Physical Therapy Ortho, Evaluate and treat    Biceps tendinitis of right upper extremity    Relevant Medications     methocarbamol (ROBAXIN) 500 MG tablet    Other Relevant Orders    Ambulatory Referral to Physical Therapy Ortho, Evaluate and treat    Adhesive capsulitis of right shoulder    Relevant Medications    methocarbamol (ROBAXIN) 500 MG tablet    Other Relevant Orders    Ambulatory Referral to Physical Therapy Ortho, Evaluate and treat    Bursitis of right shoulder    Relevant Medications    methocarbamol (ROBAXIN) 500 MG tablet    Other Relevant Orders    Ambulatory Referral to Physical Therapy Ortho, Evaluate and treat    Impingement syndrome of right shoulder    Relevant Medications    methocarbamol (ROBAXIN) 500 MG tablet    Other Relevant Orders    Ambulatory Referral to Physical Therapy Ortho, Evaluate and treat    Right shoulder pain    Relevant Medications    methocarbamol (ROBAXIN) 500 MG tablet    Other Relevant Orders    Ambulatory Referral to Physical Therapy Ortho, Evaluate and treat    Nontraumatic complete tear of right rotator cuff - Primary    Relevant Medications    methocarbamol (ROBAXIN) 500 MG tablet    Other Relevant Orders    Ambulatory Referral to Physical Therapy Ortho, Evaluate and treat          I counseled on the following treatment options for massive chronic rotator cuff tears.    1. Nonoperative treatment -patient desires to avoid surgery and elects to treat this nonoperatively which is reasonable Reading protocol provided, physical therapy provided.  He understands that the supra and infraspinatus are irreparable.  Reading protocol for deltoid strengthening discussed.    2.  rotator cuff repair-discussed this would be unpredictable -irreparable supra and infraspinatus tears upper border tearing of subscapularis would be amenable to surgery patient does not desire to proceed with surgery at this time.    3.  Reverse shoulder arthroplasty -young age, no arthritis.  With somewhat preserved active motion.  He understand that he may eventually need a reverse shoulder replacement but I would  not recommend at this time nor is he interested at this time.      Quite stoic I think he will respond well with conservative course here given the chronic nature of these tears that may be a date back even 20 years with his original trauma.  He would like to see one of my colleagues for knee arthritis possible discussion of knee replacement.  He was interested in something for pain I do not recommend opioids for these findings but Robaxin is reasonable for pain and spasms.  Counseled patient on the medication.        Follow Up: 2-3 months      Rob Gallardo MD, FAAOS  Orthopedic Surgeon  Fellowship Trained Shoulder and Elbow Surgeon  [facility]  Orthopedics and Sports Medicine  [address] [place] [address], [address]  [place] [address]    05/05/22  12:47 EDT

## 2022-05-06 NOTE — TELEPHONE ENCOUNTER
Immobilization is the last thing we want him to do.  He needs to keep the shoulder moving and doing the PT to avoid getting stiff.

## 2022-05-25 ENCOUNTER — TREATMENT (OUTPATIENT)
Dept: PHYSICAL THERAPY | Facility: CLINIC | Age: 57
End: 2022-05-25

## 2022-05-25 DIAGNOSIS — M75.01 ADHESIVE CAPSULITIS OF RIGHT SHOULDER: ICD-10-CM

## 2022-05-25 DIAGNOSIS — M75.121 NONTRAUMATIC COMPLETE TEAR OF RIGHT ROTATOR CUFF: Primary | ICD-10-CM

## 2022-05-25 DIAGNOSIS — M25.511 CHRONIC RIGHT SHOULDER PAIN: ICD-10-CM

## 2022-05-25 DIAGNOSIS — G89.29 CHRONIC RIGHT SHOULDER PAIN: ICD-10-CM

## 2022-05-25 PROCEDURE — 97162 PT EVAL MOD COMPLEX 30 MIN: CPT | Performed by: PHYSICAL THERAPIST

## 2022-05-25 NOTE — PROGRESS NOTES
Physical Therapy Initial Evaluation and Plan of Care        Patient: Zane Esparza   : 1965  Diagnosis/ICD-10 Code:  Nontraumatic complete tear of right rotator cuff [M75.121]  Referring practitioner: Rob Gallardo MD  Date of Initial Visit: 2022  Today's Date: 2022  Patient seen for 1 sessions           Subjective Questionnaire: QuickDASH: 47.7%      Subjective Evaluation    History of Present Illness  Mechanism of injury: R shoulder pain. Patient initially started after a fall 4-5 months ago. MRI revealed full and irreparable supraspinatus and infraspinatus tears, in addition to full thickness subscapularis tear of the upper fibers. No GH OA noted. Patient elected against surgery and for conservative care. Patient reports progressive stiffness over the past few months.     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  Exacerbated by: lifting, reaching up or out to the side, lying on R side.    Hand dominance: right    Patient Goals  Patient goals for therapy: decreased pain, increased motion and increased strength             Objective          Palpation   Left   Hypertonic in the upper trapezius.     Right   Hypertonic in the levator scapulae, teres minor and upper trapezius. Tenderness of the infraspinatus, supraspinatus and teres minor.     Active Range of Motion   Left Shoulder   Flexion: 170 degrees with pain  Extension: 70 degrees   Abduction: 165 degrees with pain  External rotation BTH: T5   Internal rotation BTB: T8     Right Shoulder  lmr for patient to call back     Flexion: 160 (painful arc >90 deg) degrees   Extension: 60 degrees with pain  Abduction: 145 (painful arc >70 deg) degrees   External rotation BTH: C6 with pain  Internal rotation BTB: L2 with pain    Additional Active Range of Motion Details  R UE movements generally slow, deliberate    Passive Range of Motion     Right Shoulder   Flexion: 120 degrees with pain  Abduction: 130 degrees with pain  External rotation 90°: 95 degrees   Internal rotation 90°: 55 degrees with pain    Additional Passive Range of Motion Details  Limited by pain on R    Joint Play   Left Shoulder  Joints within functional limits are the anterior capsule, posterior capsule and inferior capsule.     Right Shoulder  Hypermobile in the anterior capsule, posterior capsule and inferior capsule.     Strength/Myotome Testing     Left Shoulder     Planes of Motion   Flexion: 4+   Abduction: 4+   External rotation at 0°: 5   Internal rotation at 0°: 5     Right Shoulder     Planes of Motion   Flexion: 4- (pain)   Extension: 5   Abduction: 4+ (pain)   External rotation at 0°: 4- (pain)   External rotation at 90°: 4- (pain)   Internal rotation at 0°: 4- (pain)   Internal rotation at 90°: 4 (pain)   Horizontal abduction: 4+ (pain)   Horizontal adduction: 4     Isolated Muscles   Serratus anterior: 4 (pain)           Assessment & Plan     Assessment  Impairments: abnormal or restricted ROM, activity intolerance, impaired physical strength, pain with function and weight-bearing intolerance  Functional Limitations: carrying objects, lifting, sleeping, pulling, pushing, uncomfortable because of pain, moving in bed, reaching behind back, reaching overhead and unable to perform repetitive tasks  Assessment details: Patient presents with chronic R shoulder pain secondary to multiple full thickness rotator cuff tendon tears (supraspinatus, infraspinatis, part of subscapularis), GH hypermobility due to history of dislocation, and comorbidities. Patient's AROM,  PROM, and strength are relatively good relative to his injuries.    Barriers to therapy: comorbidities, chronicity  Prognosis: good    Goals  Plan Goals: Short Term Goals (4 wks)  1. Patient will improve Quick Dash score to < 36 %.  2. Pt will demonstrate symmetrical, pain-free shoulder AROM.  3. Patient will be independent and compliant with initial home exercise program.     Long Term Goals (8 wks)  1. Patient to resume using R UE to wash his head.  2. Patient will improve Quick Dash score to < 25 %.  3. Patient to resume sleeping in his bed at all times without need for the recliner.  4. Pt. will be independent and compliant with advanced home exercise program to facilitate self-management of symptoms.      Plan  Therapy options: will be seen for skilled therapy services  Planned modality interventions: cryotherapy, dry needling, TENS and thermotherapy (hydrocollator packs)  Planned therapy interventions: manual therapy, neuromuscular re-education, soft tissue mobilization, spinal/joint mobilization, therapeutic activities, strengthening, joint mobilization, home exercise program, functional ROM exercises, stretching, motor coordination training, body mechanics training, balance/weight-bearing training, ADL retraining, abdominal trunk stabilization and flexibility  Frequency: 2x week  Duration in weeks: 8  Treatment plan discussed with: patient  Plan details: 2x/week for 8 weeks        Timed:  Manual Therapy:    0     mins  22427;  Therapeutic Exercise:    0     mins  25009;     Neuromuscular Edgar:    0    mins  71399;    Therapeutic Activity:     0     mins  36396;     Gait Trainin     mins  27213;     Ultrasound:     0     mins  74225;    Electrical Stimulation:    0     mins  68671 ( );    Untimed:  Electrical Stimulation:    0     mins  77791 ( );  Mechanical Traction:    0     mins  09324;     Timed Treatment:   0   mins   Total Treatment:     50   mins    PT SIGNATURE: Gomez Augustin  PT   License Number: [id number]  DATE TREATMENT INITIATED: 5/25/2022    Initial Certification  Certification Period: 8/23/2022  I certify that the therapy services are furnished while this patient is under my care.  The services outlined above are required by this patient, and will be reviewed every 90 days.     PHYSICIAN: Rob Gallardo MD      DATE:     Please sign and return via fax to [phone].. Thank you, [facility].

## 2022-05-26 ENCOUNTER — TREATMENT (OUTPATIENT)
Dept: PHYSICAL THERAPY | Facility: CLINIC | Age: 57
End: 2022-05-26

## 2022-05-26 ENCOUNTER — OFFICE VISIT (OUTPATIENT)
Dept: ORTHOPEDIC SURGERY | Facility: CLINIC | Age: 57
End: 2022-05-26

## 2022-05-26 VITALS
BODY MASS INDEX: 40.47 KG/M2 | SYSTOLIC BLOOD PRESSURE: 160 MMHG | WEIGHT: 267 LBS | DIASTOLIC BLOOD PRESSURE: 92 MMHG | HEIGHT: 68 IN

## 2022-05-26 DIAGNOSIS — M75.01 ADHESIVE CAPSULITIS OF RIGHT SHOULDER: ICD-10-CM

## 2022-05-26 DIAGNOSIS — G89.29 CHRONIC RIGHT SHOULDER PAIN: ICD-10-CM

## 2022-05-26 DIAGNOSIS — M25.561 RIGHT KNEE PAIN, UNSPECIFIED CHRONICITY: ICD-10-CM

## 2022-05-26 DIAGNOSIS — M25.511 CHRONIC RIGHT SHOULDER PAIN: ICD-10-CM

## 2022-05-26 DIAGNOSIS — M75.121 NONTRAUMATIC COMPLETE TEAR OF RIGHT ROTATOR CUFF: Primary | ICD-10-CM

## 2022-05-26 DIAGNOSIS — M17.31 POST-TRAUMATIC OSTEOARTHRITIS OF RIGHT KNEE: Primary | ICD-10-CM

## 2022-05-26 PROCEDURE — 99214 OFFICE O/P EST MOD 30 MIN: CPT | Performed by: ORTHOPAEDIC SURGERY

## 2022-05-26 PROCEDURE — 20610 DRAIN/INJ JOINT/BURSA W/O US: CPT | Performed by: ORTHOPAEDIC SURGERY

## 2022-05-26 PROCEDURE — 97112 NEUROMUSCULAR REEDUCATION: CPT | Performed by: PHYSICAL THERAPIST

## 2022-05-26 PROCEDURE — 97110 THERAPEUTIC EXERCISES: CPT | Performed by: PHYSICAL THERAPIST

## 2022-05-26 RX ORDER — INSULIN ASPART 100 [IU]/ML
INJECTION, SOLUTION INTRAVENOUS; SUBCUTANEOUS
COMMUNITY
Start: 2022-04-25

## 2022-05-26 RX ORDER — PEN NEEDLE, DIABETIC 31 GX5/16"
NEEDLE, DISPOSABLE MISCELLANEOUS
COMMUNITY
Start: 2022-05-19

## 2022-05-26 RX ORDER — BLOOD SUGAR DIAGNOSTIC
STRIP MISCELLANEOUS
COMMUNITY
Start: 2022-03-14

## 2022-05-26 RX ORDER — INSULIN GLARGINE 100 [IU]/ML
INJECTION, SOLUTION SUBCUTANEOUS
COMMUNITY
Start: 2022-04-18

## 2022-05-26 RX ORDER — LOSARTAN POTASSIUM 50 MG/1
50 TABLET ORAL DAILY
COMMUNITY
Start: 2022-05-19

## 2022-05-26 RX ORDER — TRIAMCINOLONE ACETONIDE 40 MG/ML
80 INJECTION, SUSPENSION INTRA-ARTICULAR; INTRAMUSCULAR
Status: COMPLETED | OUTPATIENT
Start: 2022-05-26 | End: 2022-05-26

## 2022-05-26 RX ORDER — LIDOCAINE HYDROCHLORIDE 10 MG/ML
3 INJECTION, SOLUTION EPIDURAL; INFILTRATION; INTRACAUDAL; PERINEURAL
Status: COMPLETED | OUTPATIENT
Start: 2022-05-26 | End: 2022-05-26

## 2022-05-26 RX ADMIN — LIDOCAINE HYDROCHLORIDE 3 ML: 10 INJECTION, SOLUTION EPIDURAL; INFILTRATION; INTRACAUDAL; PERINEURAL at 12:03

## 2022-05-26 RX ADMIN — TRIAMCINOLONE ACETONIDE 80 MG: 40 INJECTION, SUSPENSION INTRA-ARTICULAR; INTRAMUSCULAR at 12:03

## 2022-05-26 NOTE — PROGRESS NOTES
Procedure   Large Joint Arthrocentesis: R knee  Date/Time: 5/26/2022 12:03 PM  Consent given by: patient  Site marked: site marked  Timeout: Immediately prior to procedure a time out was called to verify the correct patient, procedure, equipment, support staff and site/side marked as required   Supporting Documentation  Indications: pain   Procedure Details  Location: knee - R knee  Preparation: Patient was prepped and draped in the usual sterile fashion  Needle size: 22 G  Approach: anterolateral  Medications administered: 80 mg triamcinolone acetonide 40 MG/ML; 3 mL lidocaine PF 1% 1 % (1cc Marcaine .75%   NDC 5008-7966-76  Lot [id number]  4/1/23)  Patient tolerance: patient tolerated the procedure well with no immediate complications

## 2022-05-26 NOTE — PROGRESS NOTES
Orthopaedic Clinic Note: Knee New Patient    Chief Complaint   Patient presents with   • Right Knee - Pain, Initial Evaluation        HPI    Zane Esparza is a 57 y.o. male who presents with right knee pain for 2 month(s). Onset mechanical fall. Pain is localized to the patella and is a 4/10 on the pain scale. Pain is described as dull and tingling. Associated symptoms include pain, swelling, popping, stiffness and giving way/buckling. The pain is worse with walking, climbing stairs, rising from seated position and any movement of the joint; Nothing makes it better. Previous treatments have included: bracing and steroid injection (last injection 10 yrs ago BGO) since symptom onset. Although some transient relief was reported with these interventions, these conservative measures have failed and symptoms have persisted. The patient is limited in daily activities and has had a significant decrease in quality of life as a result. He admits to  night sweats.    I have reviewed the following portions of the patient's history:History of Present Illness    Past Medical History:   Diagnosis Date   • Anemia    • Arthritis    • Asthma    • Back problem    • Colonic fistula    • Diabetes (HCC)    • Gout    • Hypertension       Past Surgical History:   Procedure Laterality Date   • APPENDECTOMY  1981   • COLON RESECTION     • KNEE ACL RECONSTRUCTION     • LAMINECTOMY      15 years.       Family History   Problem Relation Age of Onset   • Cancer Mother    • Hypertension Mother    • Hypertension Father    • Heart disease Father    • Alzheimer's disease Maternal Grandmother    • Heart block Maternal Grandmother    • Heart block Maternal Grandfather    • Diabetes Paternal Grandmother    • Heart attack Paternal Grandfather      Social History     Socioeconomic History   • Marital status:    • Number of children: 2   Tobacco Use   • Smoking status: Current Some Day Smoker   • Smokeless tobacco: Never Used   Substance  and Sexual Activity   • Alcohol use: Yes   • Drug use: Not Currently     Types: Marijuana   • Sexual activity: Defer      Current Outpatient Medications on File Prior to Visit   Medication Sig Dispense Refill   • albuterol sulfate  (90 Base) MCG/ACT inhaler Inhale 2 puffs Every 6 (Six) Hours As Needed.     • amLODIPine (NORVASC) 10 MG tablet Take 10 mg by mouth.     • amoxicillin-clavulanate (AUGMENTIN) 875-125 MG per tablet Take 1 tablet by mouth 2 (Two) Times a Day. 20 tablet 0   • aspirin 81 MG chewable tablet Chew 81 mg.     • atenolol (TENORMIN) 100 MG tablet Take 100 mg by mouth Daily.     • B-D ULTRAFINE III SHORT PEN 31G X 8 MM misc USE WITH INSULIN ONCE DAILY     • cloNIDine (CATAPRES) 0.2 MG tablet Take  by mouth Every 8 (Eight) Hours.     • ferrous sulfate 325 (65 FE) MG tablet Take 325 mg by mouth.     • folic acid (FOLVITE) 1 MG tablet Take  by mouth Daily.     • hydroCHLOROthiazide (HYDRODIURIL) 12.5 MG tablet Take 12.5 mg by mouth Daily.     • hydroxychloroquine (PLAQUENIL) 200 MG tablet Take 200 mg by mouth.     • Lantus SoloStar 100 UNIT/ML injection pen INJECT 15 UNITS SUBCUTANEOUSLY AT BEDTIME     • loperamide (IMODIUM) 2 MG capsule Take 1 capsule by mouth 4 (Four) Times a Day As Needed for Diarrhea. 10 capsule 0   • losartan (COZAAR) 50 MG tablet Take 50 mg by mouth Daily.     • metoprolol succinate XL (TOPROL-XL) 50 MG 24 hr tablet Take 100 mg by mouth.     • NovoLOG FlexPen 100 UNIT/ML solution pen-injector sc pen INJECT 14 UNITS INTO THE SKIN BEDFORE MEALS     • omeprazole (priLOSEC) 20 MG capsule Take 20 mg by mouth.     • ondansetron ODT (ZOFRAN-ODT) 4 MG disintegrating tablet Place 1 tablet on the tongue Every 8 (Eight) Hours As Needed for Nausea or Vomiting for up to 6 doses. 6 tablet 0   • OneTouch Verio test strip TEST TWO TIMES A DAY     • pravastatin (PRAVACHOL) 20 MG tablet Take 20 mg by mouth Daily.     • [DISCONTINUED] amLODIPine (NORVASC) 10 MG tablet Take 10 mg by mouth  "Daily.     • [DISCONTINUED] lisinopril-hydrochlorothiazide (PRINZIDE,ZESTORETIC) 20-12.5 MG per tablet Take 1 tablet by mouth.       No current facility-administered medications on file prior to visit.      No Known Allergies     Review of Systems   Constitutional: Negative.    HENT: Negative.    Eyes: Negative.    Respiratory: Negative.    Cardiovascular: Negative.    Gastrointestinal: Negative.    Endocrine: Negative.    Genitourinary: Negative.    Musculoskeletal: Positive for arthralgias.   Skin: Negative.    Allergic/Immunologic: Negative.    Neurological: Negative.    Hematological: Negative.    Psychiatric/Behavioral: Negative.         The patient's Review of Systems was personally reviewed and confirmed as accurate.    The following portions of the patient's history were reviewed and updated as appropriate: allergies, current medications, past family history, past medical history, past social history, past surgical history and problem list.    Physical Exam  Blood pressure 160/92, height 172.7 cm (67.99\"), weight 121 kg (267 lb).    Body mass index is 40.61 kg/m².    GENERAL APPEARANCE: awake, alert & oriented x 3, in no acute distress and well developed, well nourished  PSYCH: normal affect  LUNGS:  breathing nonlabored  EYES: sclera anicteric  CARDIOVASCULAR: palpable dorsalis pedis, palpable posterior tibial bilaterally. Capillary refill less than 2 seconds  EXTREMITIES: no clubbing, cyanosis  GAIT:  Antalgic            Right Lower Extremity Exam:   ----------  Hip Exam  ----------  FLEXION CONTRACTURE: None  FLEXION: 110 degrees  INTERNAL ROTATION: 20 degrees at 90 degrees of flexion   EXTERNAL ROTATION: 40 degrees at 90 degrees of flexion    PAIN WITH HIP MOTION: no  ----------  Knee Exam  ----------  ALIGNMENT: severe varus, correctable to neutral    RANGE OF MOTION:  Decreased (10 - 115 degrees) with no extensor lag  LIGAMENTOUS STABILITY:   stable to varus and valgus stress at terminal extension and " 30 degrees; retensioning of the MCL is appreciated with valgus stress at 30 degrees consistent with medial compartment degeneration     STRENGTH:  4/5 knee flexion, extension. 5/5 ankle dorsiflexion and plantarflexion.     PAIN WITH PALPATION: global  KNEE EFFUSION: yes, mild effusion  PAIN WITH KNEE ROM: yes, global  PATELLAR CREPITUS: yes, painful and symptomatic  SPECIAL EXAM FINDINGS:  none    REFLEXES:  PATELLAR 2+/4  ACHILLES 2+/4    CLONUS: no  STRAIGHT LEG TEST:   negative    SENSATION TO LIGHT TOUCH:  DEEP PERONEAL/SUPERFICIAL PERONEAL/SURAL/SAPHENOUS/TIBIAL:   intact    EDEMA:  no  ERYTHEMA:  no  WOUNDS/INCISIONS: Well-healed surgical incisions        Left Lower Extremity Exam:   ----------  Hip Exam  ----------  FLEXION CONTRACTURE: None  FLEXION: 110 degrees  INTERNAL ROTATION: 20 degrees at 90 degrees of flexion   EXTERNAL ROTATION: 40 degrees at 90 degrees of flexion    PAIN WITH HIP MOTION: no  ----------  Knee Exam  ----------  ALIGNMENT: neutral, no varus or valgus deformity     RANGE OF MOTION:  Normal (0-120 degrees) with no extensor lag or flexion contracture  LIGAMENTOUS STABILITY:   stable to varus and valgus stress at terminal extension and 30 degrees without any evidence of laxity     STRENGTH:  5/5 knee flexion, extension. 5/5 ankle dorsiflexion and plantarflexion.     PAIN WITH PALPATION: denies tenderness to palpation about the knee, denies medial or lateral joint line pain  KNEE EFFUSION: no  PAIN WITH KNEE ROM: no  PATELLAR CREPITUS: yes, asymptomatic  SPECIAL EXAM FINDINGS:  Negative patellar compression    REFLEXES:  PATELLAR 2+/4  ACHILLES 2+/4    CLONUS: negative  STRAIGHT LEG TEST:   negative    SENSATION TO LIGHT TOUCH:  DEEP PERONEAL/SUPERFICIAL PERONEAL/SURAL/SAPHENOUS/TIBIAL:   intact    EDEMA:  no  ERYTHEMA:  no  WOUNDS/INCISIONS: no overlying skin  problems.    ______________________________________________________________________  ______________________________________________________________________    RADIOGRAPHIC FINDINGS:   Indication: Right knee pain    Comparison: No prior xrays are available for comparison    Right knee(s) 4 views: moderate to severe tricompartmental arthritis with genu varum alignment, periarticular osteophytes visualized in all compartments.  Evidence of prior ACL reconstruction is identified with retained proximal tibia hardware and distal femur hardware without evidence of complication.      Assessment/Plan:   Diagnosis Plan   1. Post-traumatic osteoarthritis of right knee     2. Right knee pain, unspecified chronicity  XR Knee 4+ View Right     Patient has posttraumatic arthritis of the right knee.  He is suffering from arthritic flareup.  I discussed treatment options.  He is agreeable to pursuing intra-articular cortisone injection the right knee today.  He will follow-up in 3 months for repeat assessment.    Procedure Note:  I discussed with the patient the potential benefits of performing a therapeutic injection of the right knee as well as potential risks including but not limited to infection, swelling, pain, bleeding, bruising, nerve/vessel damage, skin color changes, transient elevation in blood glucose levels, and fat atrophy. After informed consent and verifying correct patient, procedure site, and type of procedure, the area was prepped with alcohol, ethyl chloride was used to numb the skin. Via the superior lateral approach, 3cc of 1% lidocaine, 1 cc of 0.75% Marcaine and 2 cc of 40mg/ml of Kenalog were injected into the right knee. The patient tolerated the procedure well. There were no complications. A sterile dressing was placed over the injection site.    Aguila Campos MD  05/26/22  12:03 EDT

## 2022-05-26 NOTE — PROGRESS NOTES
Physical Therapy Daily Progress Note        Patient: Zane Esparza   : 1965  Diagnosis/ICD-10 Code:  Nontraumatic complete tear of right rotator cuff [M75.121]  Referring practitioner: Rob Gallardo MD  Date of Initial Visit: Type: THERAPY  Noted: 2022  Today's Date: 2022  Patient seen for 2 sessions           Patient reports: his shoulder felt pretty good after yesterday's visit.      Objective   See Exercise, Manual, and Modality Logs for complete treatment.       Assessment/Plan    Advanced rotational strengthening, overhead AAROM, and periscapular strengthening with overall good tolerance. Mild pain reported with some exercises but they were able to be modified for pain-free ROM without issue. Added exercises to address these areas to HEP.          Timed:  Manual Therapy:    0     mins  59264;  Therapeutic Exercise:    30     mins  79408;     Neuromuscular Edgar:   15    mins  24291;    Therapeutic Activity:     0     mins  08525;     Gait Trainin     mins  27902;     Ultrasound:     0     mins  82681;    Electrical Stimulation:    0     mins  79936 ( );    Untimed:  Electrical Stimulation:    0     mins  91787 ( );  Mechanical Traction:    0     mins  82473;     Timed Treatment:   45   mins   Total Treatment:     45   mins    Gomez Augustin PT  Physical Therapist

## 2022-08-30 ENCOUNTER — OFFICE VISIT (OUTPATIENT)
Dept: ORTHOPEDIC SURGERY | Facility: CLINIC | Age: 57
End: 2022-08-30

## 2022-08-30 VITALS
BODY MASS INDEX: 40.92 KG/M2 | SYSTOLIC BLOOD PRESSURE: 140 MMHG | HEIGHT: 68 IN | DIASTOLIC BLOOD PRESSURE: 90 MMHG | WEIGHT: 270 LBS

## 2022-08-30 DIAGNOSIS — E66.01 CLASS 3 SEVERE OBESITY DUE TO EXCESS CALORIES WITHOUT SERIOUS COMORBIDITY WITH BODY MASS INDEX (BMI) OF 40.0 TO 44.9 IN ADULT: ICD-10-CM

## 2022-08-30 DIAGNOSIS — M17.31 POST-TRAUMATIC OSTEOARTHRITIS OF RIGHT KNEE: Primary | ICD-10-CM

## 2022-08-30 PROCEDURE — 20610 DRAIN/INJ JOINT/BURSA W/O US: CPT | Performed by: ORTHOPAEDIC SURGERY

## 2022-08-30 PROCEDURE — 99213 OFFICE O/P EST LOW 20 MIN: CPT | Performed by: ORTHOPAEDIC SURGERY

## 2022-08-30 RX ORDER — TRIAMCINOLONE ACETONIDE 40 MG/ML
80 INJECTION, SUSPENSION INTRA-ARTICULAR; INTRAMUSCULAR
Status: COMPLETED | OUTPATIENT
Start: 2022-08-30 | End: 2022-08-30

## 2022-08-30 RX ORDER — LIDOCAINE HYDROCHLORIDE 10 MG/ML
3 INJECTION, SOLUTION EPIDURAL; INFILTRATION; INTRACAUDAL; PERINEURAL
Status: COMPLETED | OUTPATIENT
Start: 2022-08-30 | End: 2022-08-30

## 2022-08-30 RX ORDER — BUPIVACAINE HYDROCHLORIDE 2.5 MG/ML
3 INJECTION, SOLUTION EPIDURAL; INFILTRATION; INTRACAUDAL
Status: COMPLETED | OUTPATIENT
Start: 2022-08-30 | End: 2022-08-30

## 2022-08-30 RX ADMIN — LIDOCAINE HYDROCHLORIDE 3 ML: 10 INJECTION, SOLUTION EPIDURAL; INFILTRATION; INTRACAUDAL; PERINEURAL at 11:08

## 2022-08-30 RX ADMIN — BUPIVACAINE HYDROCHLORIDE 3 ML: 2.5 INJECTION, SOLUTION EPIDURAL; INFILTRATION; INTRACAUDAL at 11:08

## 2022-08-30 RX ADMIN — TRIAMCINOLONE ACETONIDE 80 MG: 40 INJECTION, SUSPENSION INTRA-ARTICULAR; INTRAMUSCULAR at 11:08

## 2022-08-30 NOTE — PROGRESS NOTES
Orthopaedic Clinic Note: Knee Established Patient    Chief Complaint   Patient presents with   • Follow-up     3 month recheck - Post-traumatic osteoarthritis of right knee         HPI    It has been 3  month(s) since Mr. Esparza's last visit. He returns to clinic today for Follow-up posttraumatic arthritis of the right knee.  He underwent cortisone injection 3 months ago.  Injection provided about 4-6 weeks of relief.  Pain is gradually returned.  Rates pain 7/10 on the pain scale.  He is ambulating with no assistive device.  Denies fevers chills or constitutional symptoms.  He remains overweight with a BMI of 41.06.  Overall he is doing about the same.    Past Medical History:   Diagnosis Date   • Anemia    • Arthritis    • Asthma    • Back problem    • Colonic fistula    • Diabetes (HCC)    • Gout    • Hypertension       Past Surgical History:   Procedure Laterality Date   • APPENDECTOMY  1981   • COLON RESECTION     • KNEE ACL RECONSTRUCTION     • LAMINECTOMY      15 years.       Family History   Problem Relation Age of Onset   • Cancer Mother    • Hypertension Mother    • Hypertension Father    • Heart disease Father    • Alzheimer's disease Maternal Grandmother    • Heart block Maternal Grandmother    • Heart block Maternal Grandfather    • Diabetes Paternal Grandmother    • Heart attack Paternal Grandfather      Social History     Socioeconomic History   • Marital status:    • Number of children: 2   Tobacco Use   • Smoking status: Current Some Day Smoker   • Smokeless tobacco: Never Used   Substance and Sexual Activity   • Alcohol use: Yes   • Drug use: Not Currently     Types: Marijuana   • Sexual activity: Defer      Current Outpatient Medications on File Prior to Visit   Medication Sig Dispense Refill   • albuterol sulfate  (90 Base) MCG/ACT inhaler Inhale 2 puffs Every 6 (Six) Hours As Needed.     • amLODIPine (NORVASC) 10 MG tablet Take 10 mg by mouth.     • amoxicillin-clavulanate  (AUGMENTIN) 875-125 MG per tablet Take 1 tablet by mouth 2 (Two) Times a Day. 20 tablet 0   • aspirin 81 MG chewable tablet Chew 81 mg.     • atenolol (TENORMIN) 100 MG tablet Take 100 mg by mouth Daily.     • B-D ULTRAFINE III SHORT PEN 31G X 8 MM misc USE WITH INSULIN ONCE DAILY     • cloNIDine (CATAPRES) 0.2 MG tablet Take  by mouth Every 8 (Eight) Hours.     • ferrous sulfate 325 (65 FE) MG tablet Take 325 mg by mouth.     • folic acid (FOLVITE) 1 MG tablet Take  by mouth Daily.     • hydroCHLOROthiazide (HYDRODIURIL) 12.5 MG tablet Take 12.5 mg by mouth Daily.     • hydroxychloroquine (PLAQUENIL) 200 MG tablet Take 200 mg by mouth.     • Lantus SoloStar 100 UNIT/ML injection pen INJECT 15 UNITS SUBCUTANEOUSLY AT BEDTIME     • loperamide (IMODIUM) 2 MG capsule Take 1 capsule by mouth 4 (Four) Times a Day As Needed for Diarrhea. 10 capsule 0   • losartan (COZAAR) 50 MG tablet Take 50 mg by mouth Daily.     • metoprolol succinate XL (TOPROL-XL) 50 MG 24 hr tablet Take 100 mg by mouth.     • NovoLOG FlexPen 100 UNIT/ML solution pen-injector sc pen INJECT 14 UNITS INTO THE SKIN BEDFORE MEALS     • omeprazole (priLOSEC) 20 MG capsule Take 20 mg by mouth.     • ondansetron ODT (ZOFRAN-ODT) 4 MG disintegrating tablet Place 1 tablet on the tongue Every 8 (Eight) Hours As Needed for Nausea or Vomiting for up to 6 doses. 6 tablet 0   • OneTouch Verio test strip TEST TWO TIMES A DAY     • pravastatin (PRAVACHOL) 20 MG tablet Take 20 mg by mouth Daily.       No current facility-administered medications on file prior to visit.      No Known Allergies     Review of Systems   Constitutional: Negative.    HENT: Negative.    Eyes: Negative.    Respiratory: Negative.    Cardiovascular: Negative.    Gastrointestinal: Negative.    Endocrine: Negative.    Genitourinary: Negative.    Musculoskeletal: Positive for arthralgias.   Skin: Negative.    Allergic/Immunologic: Negative.    Neurological: Negative.    Hematological: Negative.   "  Psychiatric/Behavioral: Negative.         The patient's Review of Systems was personally reviewed and confirmed as accurate.    Physical Exam  Blood pressure 140/90, height 172.7 cm (67.99\"), weight 122 kg (270 lb).    Body mass index is 41.06 kg/m².    GENERAL APPEARANCE: awake, alert, oriented, in no acute distress and well developed, well nourished  LUNGS:  breathing nonlabored  EXTREMITIES: no clubbing, cyanosis  PERIPHERAL PULSES: palpable dorsalis pedis and posterior tibial pulses bilaterally.    GAIT:  Antalgic        ----------  Right Knee Exam:  ----------  ALIGNMENT: severe varus, correctable to neutral  ----------  RANGE OF MOTION:  Decreased (10 - 115 degrees) with no extensor lag  LIGAMENTOUS STABILITY:   stable to varus and valgus stress at terminal extension and 30 degrees; retensioning of the MCL is appreciated with valgus stress at 30 degrees consistent with medial compartment degeneration  ----------  STRENGTH:  KNEE FLEXION 4/5  KNEE EXTENSION  4/5  ANKLE DORSIFLEXION  5/5  ANKLE PLANTARFLEXION  5/5  ----------  PAIN WITH PALPATION:global  KNEE EFFUSION: yes, mild effusion  PAIN WITH KNEE ROM: yes  PATELLAR CREPITUS:  yes, painful and symptomatic  ----------  SENSATION TO LIGHT TOUCH:  DEEP PERONEAL/SUPERFICIAL PERONEAL/SURAL/SAPHENOUS/TIBIAL:    intact  ----------  EDEMA:  no  ERYTHEMA:    no  WOUNDS/INCISIONS:   no  _____________________________________________________________________  _____________________________________________________________________    RADIOGRAPHIC FINDINGS:   No new imaging today    Assessment/Plan:   Diagnosis Plan   1. Post-traumatic osteoarthritis of right knee     2. Class 3 severe obesity due to excess calories without serious comorbidity with body mass index (BMI) of 40.0 to 44.9 in adult (HCC)       The patient has failed conservative treatment measures and is a candidate for joint arthroplasty having his BMI below 40.  Alternative conservative treatment measures " were discussed including bracing, therapy, topical/oral anti-inflammatories, activity modification, and weight loss.  The patient considered these treatment options and wishes to proceed with corticosteroid injection(s) today.  Therefore we will proceed with corticosteroid injection(s) today.  Follow-up 3 months for repeat assessment.    Patient has an elevated BMI greater than 40.  This places the patient at increased risk for perioperative complications as well as increased risk of accelerating the degenerative processes within the joint.  As a result, surgical intervention will be deferred until the patient can achieve a BMI less than 40.  In the interval, the patient has been instructed on weight loss avenues including diet, portion control, calorie restriction, low/no impact exercise, referral to weight loss management and/or bariatric surgery.  It was explained that weight loss can improve joint pain alone by decreasing the joint reaction forces.  For every pound of weight change, the knee and hip joints see a 4 to 5 fold change in pressure.  Given these options, the patient will proceed with low calorie diet and low impact exercise.    Procedure Note:  I discussed with the patient the potential benefits of performing a therapeutic injection of the right knee as well as potential risks including but not limited to infection, swelling, pain, bleeding, bruising, nerve/vessel damage, skin color changes, transient elevation in blood glucose levels, and fat atrophy. After informed consent and verifying correct patient, procedure site, and type of procedure, the area was prepped with alcohol, ethyl chloride was used to numb the skin. Via the superior lateral approach, 3cc of 1% lidocaine, 3 cc of 0.25% Marcaine and 2 cc of 40mg/ml of Kenalog were injected into the right knee. The patient tolerated the procedure well. There were no complications. A sterile dressing was placed over the injection site.        Aguila SANTAMARIA  MD Andres  08/30/22  11:08 EDT

## 2022-08-30 NOTE — PROGRESS NOTES
Procedure   Large Joint Arthrocentesis: R knee  Date/Time: 8/30/2022 11:08 AM  Consent given by: patient  Site marked: site marked  Timeout: Immediately prior to procedure a time out was called to verify the correct patient, procedure, equipment, support staff and site/side marked as required   Supporting Documentation  Indications: pain   Procedure Details  Location: knee - R knee  Preparation: Patient was prepped and draped in the usual sterile fashion  Needle size: 22 G  Approach: anterolateral  Medications administered: 80 mg triamcinolone acetonide 40 MG/ML; 3 mL bupivacaine (PF) 0.25 %; 3 mL lidocaine PF 1% 1 %  Patient tolerance: patient tolerated the procedure well with no immediate complications

## 2022-12-01 ENCOUNTER — OFFICE VISIT (OUTPATIENT)
Dept: ORTHOPEDIC SURGERY | Facility: CLINIC | Age: 57
End: 2022-12-01

## 2022-12-01 VITALS
SYSTOLIC BLOOD PRESSURE: 118 MMHG | BODY MASS INDEX: 41.98 KG/M2 | HEIGHT: 68 IN | DIASTOLIC BLOOD PRESSURE: 84 MMHG | WEIGHT: 277 LBS

## 2022-12-01 DIAGNOSIS — E66.01 CLASS 3 SEVERE OBESITY DUE TO EXCESS CALORIES WITHOUT SERIOUS COMORBIDITY WITH BODY MASS INDEX (BMI) OF 40.0 TO 44.9 IN ADULT: ICD-10-CM

## 2022-12-01 DIAGNOSIS — M17.31 POST-TRAUMATIC OSTEOARTHRITIS OF RIGHT KNEE: Primary | ICD-10-CM

## 2022-12-01 PROCEDURE — 20610 DRAIN/INJ JOINT/BURSA W/O US: CPT | Performed by: ORTHOPAEDIC SURGERY

## 2022-12-01 PROCEDURE — 99213 OFFICE O/P EST LOW 20 MIN: CPT | Performed by: ORTHOPAEDIC SURGERY

## 2022-12-01 RX ORDER — LIDOCAINE HYDROCHLORIDE 10 MG/ML
3 INJECTION, SOLUTION EPIDURAL; INFILTRATION; INTRACAUDAL; PERINEURAL
Status: COMPLETED | OUTPATIENT
Start: 2022-12-01 | End: 2022-12-01

## 2022-12-01 RX ORDER — TRIAMCINOLONE ACETONIDE 40 MG/ML
80 INJECTION, SUSPENSION INTRA-ARTICULAR; INTRAMUSCULAR
Status: COMPLETED | OUTPATIENT
Start: 2022-12-01 | End: 2022-12-01

## 2022-12-01 RX ADMIN — LIDOCAINE HYDROCHLORIDE 3 ML: 10 INJECTION, SOLUTION EPIDURAL; INFILTRATION; INTRACAUDAL; PERINEURAL at 10:59

## 2022-12-01 RX ADMIN — TRIAMCINOLONE ACETONIDE 80 MG: 40 INJECTION, SUSPENSION INTRA-ARTICULAR; INTRAMUSCULAR at 10:59

## 2022-12-01 NOTE — PROGRESS NOTES
Procedure   Large Joint Arthrocentesis: R knee  Date/Time: 12/1/2022 10:59 AM  Consent given by: patient  Site marked: site marked  Timeout: Immediately prior to procedure a time out was called to verify the correct patient, procedure, equipment, support staff and site/side marked as required   Supporting Documentation  Indications: pain   Procedure Details  Location: knee - R knee  Preparation: Patient was prepped and draped in the usual sterile fashion  Needle size: 22 G  Approach: anterolateral  Medications administered: 80 mg triamcinolone acetonide 40 MG/ML; 3 mL lidocaine PF 1% 1 % (SENSORCAINE .5%, ndc: [id number], LOT: [id number], EXP 1/1/26 3c)  Patient tolerance: patient tolerated the procedure well with no immediate complications

## 2022-12-01 NOTE — PROGRESS NOTES
Orthopaedic Clinic Note: Knee Established Patient    Chief Complaint   Patient presents with   • Follow-up     3 month f/u Post-traumatic osteoarthritis of right knee, last CSI 8/30/22        HPI    It has been 3  month(s) since Mr. Esparza's last visit. He returns to clinic today for follow-up right knee osteoarthritis.  He underwent cortisone injection right knee 3 months ago.  The injection provided about 2 and half months of relief.  His pain is gradually returned.  Rates his pain 4/10 on the pain scale today.  He is ambulating with no assistive device.  Denies fevers chills or constitutional symptoms.  Overall he is doing about the same.  He remains overweight with a BMI of 42.13.    Past Medical History:   Diagnosis Date   • Anemia    • Arthritis    • Asthma    • Back problem    • Colonic fistula    • Diabetes (HCC)    • Gout    • Hypertension       Past Surgical History:   Procedure Laterality Date   • APPENDECTOMY  1981   • COLON RESECTION     • KNEE ACL RECONSTRUCTION     • LAMINECTOMY      14 years.       Family History   Problem Relation Age of Onset   • Cancer Mother    • Hypertension Mother    • Hypertension Father    • Heart disease Father    • Alzheimer's disease Maternal Grandmother    • Heart block Maternal Grandmother    • Heart block Maternal Grandfather    • Diabetes Paternal Grandmother    • Heart attack Paternal Grandfather      Social History     Socioeconomic History   • Marital status:    • Number of children: 2   Tobacco Use   • Smoking status: Some Days   • Smokeless tobacco: Never   Substance and Sexual Activity   • Alcohol use: Yes   • Drug use: Not Currently     Types: Marijuana   • Sexual activity: Defer      Current Outpatient Medications on File Prior to Visit   Medication Sig Dispense Refill   • albuterol sulfate  (90 Base) MCG/ACT inhaler Inhale 2 puffs Every 6 (Six) Hours As Needed.     • amLODIPine (NORVASC) 10 MG tablet Take 10 mg by mouth.     • aspirin 81 MG  chewable tablet Chew 81 mg.     • atenolol (TENORMIN) 100 MG tablet Take 100 mg by mouth Daily.     • B-D ULTRAFINE III SHORT PEN 31G X 8 MM misc USE WITH INSULIN ONCE DAILY     • cloNIDine (CATAPRES) 0.2 MG tablet Take  by mouth Every 8 (Eight) Hours.     • ferrous sulfate 325 (65 FE) MG tablet Take 325 mg by mouth.     • folic acid (FOLVITE) 1 MG tablet Take  by mouth Daily.     • hydroCHLOROthiazide (HYDRODIURIL) 12.5 MG tablet Take 12.5 mg by mouth Daily.     • hydroxychloroquine (PLAQUENIL) 200 MG tablet Take 200 mg by mouth.     • Lantus SoloStar 100 UNIT/ML injection pen INJECT 15 UNITS SUBCUTANEOUSLY AT BEDTIME     • loperamide (IMODIUM) 2 MG capsule Take 1 capsule by mouth 4 (Four) Times a Day As Needed for Diarrhea. 10 capsule 0   • losartan (COZAAR) 50 MG tablet Take 50 mg by mouth Daily.     • metoprolol succinate XL (TOPROL-XL) 50 MG 24 hr tablet Take 100 mg by mouth.     • NovoLOG FlexPen 100 UNIT/ML solution pen-injector sc pen INJECT 14 UNITS INTO THE SKIN BEDFORE MEALS     • omeprazole (priLOSEC) 20 MG capsule Take 20 mg by mouth.     • ondansetron ODT (ZOFRAN-ODT) 4 MG disintegrating tablet Place 1 tablet on the tongue Every 8 (Eight) Hours As Needed for Nausea or Vomiting for up to 6 doses. 6 tablet 0   • OneTouch Verio test strip TEST TWO TIMES A DAY     • pravastatin (PRAVACHOL) 20 MG tablet Take 20 mg by mouth Daily.       No current facility-administered medications on file prior to visit.      No Known Allergies     Review of Systems   Constitutional: Negative.    HENT: Negative.    Eyes: Negative.    Respiratory: Negative.    Cardiovascular: Negative.    Gastrointestinal: Negative.    Endocrine: Negative.    Genitourinary: Negative.    Musculoskeletal: Positive for arthralgias.   Skin: Negative.    Allergic/Immunologic: Negative.    Neurological: Negative.    Hematological: Negative.    Psychiatric/Behavioral: Negative.         The patient's Review of Systems was personally reviewed and  "confirmed as accurate.    Physical Exam  Blood pressure 118/84, height 172.7 cm (67.99\"), weight 126 kg (277 lb).    Body mass index is 42.13 kg/m².    GENERAL APPEARANCE: awake, alert, oriented, in no acute distress, well developed, well nourished and obese  LUNGS:  breathing nonlabored  EXTREMITIES: no clubbing, cyanosis  PERIPHERAL PULSES: palpable dorsalis pedis and posterior tibial pulses bilaterally.    GAIT:  Antalgic        ----------  Right Knee Exam:  ----------  ALIGNMENT: severe varus, correctable to neutral  ----------  RANGE OF MOTION:  Decreased (10 - 115 degrees) with no extensor lag  LIGAMENTOUS STABILITY:   stable to varus and valgus stress at terminal extension and 30 degrees; retensioning of the MCL is appreciated with valgus stress at 30 degrees consistent with medial compartment degeneration  ----------  STRENGTH:  KNEE FLEXION 4/5  KNEE EXTENSION  4/5  ANKLE DORSIFLEXION  5/5  ANKLE PLANTARFLEXION  5/5  ----------  PAIN WITH PALPATION:global  KNEE EFFUSION: yes, trace effusion  PAIN WITH KNEE ROM: yes  PATELLAR CREPITUS:  yes, painful and symptomatic  ----------  SENSATION TO LIGHT TOUCH:  DEEP PERONEAL/SUPERFICIAL PERONEAL/SURAL/SAPHENOUS/TIBIAL:    intact  ----------  EDEMA:  no  ERYTHEMA:    no  WOUNDS/INCISIONS:   no    _____________________________________________________________________  _____________________________________________________________________    RADIOGRAPHIC FINDINGS:   Indication: Right knee pain    Comparison: Todays xrays were compared to previous xrays from 5/26/2022    Knee films: moderate to severe tricompartmental arthritis with genu varum alignment, periarticular osteophytes visualized in all compartments.  No evidence hardware complication from prior ACL reconstruction. No significant changes compared to prior radiographs.    Assessment/Plan:   Diagnosis Plan   1. Post-traumatic osteoarthritis of right knee  Large Joint Arthrocentesis: R knee    XR Knee 4+ View " Right    triamcinolone acetonide (KENALOG-40) injection 80 mg    lidocaine PF 1% (XYLOCAINE) injection 3 mL      2. Class 3 severe obesity due to excess calories without serious comorbidity with body mass index (BMI) of 40.0 to 44.9 in adult (HCC)          The patient has failed conservative treatment measures and is a candidate for joint arthroplasty if he get his BMI below 40.  Alternative conservative treatment measures were discussed including bracing, therapy, topical/oral anti-inflammatories, activity modification, and weight loss.  The patient considered these treatment options and wishes to proceed with corticosteroid injection(s) today.  Therefore we will proceed with corticosteroid injection(s) today.  Follow-up 3 months for repeat evaluation.    Patient has an elevated BMI. The patient has been instructed on various weight loss avenues including diet, portion control, calorie restriction, low/no impact exercise, referral to weight loss management and/or bariatric surgery.  It was explained that weight loss can improve joint pain alone by decreasing the joint reaction forces.  For every pound of weight change, the knee and hip joints see a 4 to 5 fold change in pressure.  Given these options, the patient will proceed with low calorie diet and low impact exercise.    Procedure Note:  I discussed with the patient the potential benefits of performing a therapeutic injection of the right knee as well as potential risks including but not limited to infection, swelling, pain, bleeding, bruising, nerve/vessel damage, skin color changes, transient elevation in blood glucose levels, and fat atrophy. After informed consent and verifying correct patient, procedure site, and type of procedure, the area was prepped with alcohol, ethyl chloride was used to numb the skin. Via the superolateral approach, 3 cc of 1% lidocaine, 3 cc of 0.5% Marcaine and 2 cc of 40mg/ml of Kenalog were injected into the right knee. The  patient tolerated the procedure well. There were no complications. A sterile dressing was placed over the injection site.        Aguila Campos MD  12/13/22  12:12 EST

## 2023-03-02 ENCOUNTER — OFFICE VISIT (OUTPATIENT)
Dept: ORTHOPEDIC SURGERY | Facility: CLINIC | Age: 58
End: 2023-03-02
Payer: COMMERCIAL

## 2023-03-02 VITALS
SYSTOLIC BLOOD PRESSURE: 148 MMHG | DIASTOLIC BLOOD PRESSURE: 96 MMHG | WEIGHT: 259 LBS | BODY MASS INDEX: 39.25 KG/M2 | HEIGHT: 68 IN

## 2023-03-02 DIAGNOSIS — E66.09 CLASS 2 OBESITY DUE TO EXCESS CALORIES WITHOUT SERIOUS COMORBIDITY WITH BODY MASS INDEX (BMI) OF 39.0 TO 39.9 IN ADULT: ICD-10-CM

## 2023-03-02 DIAGNOSIS — M17.31 POST-TRAUMATIC OSTEOARTHRITIS OF RIGHT KNEE: Primary | ICD-10-CM

## 2023-03-02 PROCEDURE — 99214 OFFICE O/P EST MOD 30 MIN: CPT | Performed by: ORTHOPAEDIC SURGERY

## 2023-03-02 PROCEDURE — 20610 DRAIN/INJ JOINT/BURSA W/O US: CPT | Performed by: ORTHOPAEDIC SURGERY

## 2023-03-02 RX ORDER — BUPIVACAINE HYDROCHLORIDE 2.5 MG/ML
3 INJECTION, SOLUTION EPIDURAL; INFILTRATION; INTRACAUDAL
Status: COMPLETED | OUTPATIENT
Start: 2023-03-02 | End: 2023-03-02

## 2023-03-02 RX ORDER — LIDOCAINE HYDROCHLORIDE 10 MG/ML
3 INJECTION, SOLUTION EPIDURAL; INFILTRATION; INTRACAUDAL; PERINEURAL
Status: COMPLETED | OUTPATIENT
Start: 2023-03-02 | End: 2023-03-02

## 2023-03-02 RX ORDER — SEMAGLUTIDE 1.34 MG/ML
INJECTION, SOLUTION SUBCUTANEOUS
COMMUNITY
Start: 2022-12-30

## 2023-03-02 RX ORDER — TRIAMCINOLONE ACETONIDE 40 MG/ML
80 INJECTION, SUSPENSION INTRA-ARTICULAR; INTRAMUSCULAR
Status: COMPLETED | OUTPATIENT
Start: 2023-03-02 | End: 2023-03-02

## 2023-03-02 RX ADMIN — TRIAMCINOLONE ACETONIDE 80 MG: 40 INJECTION, SUSPENSION INTRA-ARTICULAR; INTRAMUSCULAR at 11:16

## 2023-03-02 RX ADMIN — LIDOCAINE HYDROCHLORIDE 3 ML: 10 INJECTION, SOLUTION EPIDURAL; INFILTRATION; INTRACAUDAL; PERINEURAL at 11:16

## 2023-03-02 RX ADMIN — BUPIVACAINE HYDROCHLORIDE 3 ML: 2.5 INJECTION, SOLUTION EPIDURAL; INFILTRATION; INTRACAUDAL at 11:16

## 2023-03-02 NOTE — PROGRESS NOTES
Procedure   - Large Joint Arthrocentesis: R knee on 3/2/2023 11:16 AM  Indications: pain  Details: (23g) needle, superolateral approach  Medications: 3 mL lidocaine PF 1% 1 %; 80 mg triamcinolone acetonide 40 MG/ML; 3 mL bupivacaine (PF) 0.25 %  Outcome: tolerated well, no immediate complications  Procedure, treatment alternatives, risks and benefits explained, specific risks discussed. Consent was given by the patient. Immediately prior to procedure a time out was called to verify the correct patient, procedure, equipment, support staff and site/side marked as required. Patient was prepped and draped in the usual sterile fashion.

## 2023-03-02 NOTE — PROGRESS NOTES
Orthopaedic Clinic Note: Knee Established Patient    Chief Complaint   Patient presents with   • Follow-up     3 month follow up -- Post-traumatic osteoarthritis of right knee        HPI    It has been 3  month(s) since Mr. Esparza's last visit. He returns to clinic today for follow-up right knee osteoarthritis.  He underwent cortisone injection 3 months ago.  The injection worked well for about a month.  Pain is gradually returned.  Rates his pain 6/10 on the pain scale.  Over the past 2 months, his pain is gotten progressively worse.  He is having difficulty walking weightbearing.  He has been working on weight loss and wishes to lose approximately 10 more pounds before proceeding to surgery.  Pain is currently worse than previously.  Requesting further intervention for his ongoing pain today.    Past Medical History:   Diagnosis Date   • Acromioclavicular separation    • Anemia    • Arthritis    • Asthma    • Back problem    • Colonic fistula    • Diabetes (HCC)    • Frozen shoulder    • Gout    • Hypertension    • Knee swelling    • Periarthritis of shoulder    • Rotator cuff syndrome    • Tear of meniscus of knee       Past Surgical History:   Procedure Laterality Date   • APPENDECTOMY  1981   • BACK SURGERY     • COLON RESECTION     • KNEE ACL RECONSTRUCTION     • LAMINECTOMY      14 years.       Family History   Problem Relation Age of Onset   • Cancer Mother    • Hypertension Mother    • Hypertension Father    • Heart disease Father    • Diabetes Father    • Alzheimer's disease Maternal Grandmother    • Heart block Maternal Grandmother    • Cancer Maternal Grandmother    • Heart block Maternal Grandfather    • Diabetes Paternal Grandmother    • Cancer Paternal Grandmother    • Heart attack Paternal Grandfather    • Cancer Maternal Aunt      Social History     Socioeconomic History   • Marital status:    • Number of children: 2   Tobacco Use   • Smoking status: Some Days     Packs/day: 0.25     Years:  0.50     Pack years: 0.13     Types: Cigarettes   • Smokeless tobacco: Never   Substance and Sexual Activity   • Alcohol use: Yes     Alcohol/week: 3.0 - 5.0 standard drinks     Types: 3 - 5 Shots of liquor per week   • Drug use: Not Currently     Types: Marijuana   • Sexual activity: Yes     Partners: Female      Current Outpatient Medications on File Prior to Visit   Medication Sig Dispense Refill   • albuterol sulfate  (90 Base) MCG/ACT inhaler Inhale 2 puffs Every 6 (Six) Hours As Needed.     • amLODIPine (NORVASC) 10 MG tablet Take 1 tablet by mouth.     • aspirin 81 MG chewable tablet Chew 1 tablet.     • atenolol (TENORMIN) 100 MG tablet Take 1 tablet by mouth Daily.     • B-D ULTRAFINE III SHORT PEN 31G X 8 MM misc USE WITH INSULIN ONCE DAILY     • cloNIDine (CATAPRES) 0.2 MG tablet Take  by mouth Every 8 (Eight) Hours.     • ferrous sulfate 325 (65 FE) MG tablet Take 1 tablet by mouth.     • folic acid (FOLVITE) 1 MG tablet Take  by mouth Daily.     • hydroCHLOROthiazide (HYDRODIURIL) 12.5 MG tablet Take 1 tablet by mouth Daily.     • hydroxychloroquine (PLAQUENIL) 200 MG tablet Take 1 tablet by mouth.     • Lantus SoloStar 100 UNIT/ML injection pen INJECT 15 UNITS SUBCUTANEOUSLY AT BEDTIME     • loperamide (IMODIUM) 2 MG capsule Take 1 capsule by mouth 4 (Four) Times a Day As Needed for Diarrhea. 10 capsule 0   • losartan (COZAAR) 50 MG tablet Take 1 tablet by mouth Daily.     • metoprolol succinate XL (TOPROL-XL) 50 MG 24 hr tablet Take 2 tablets by mouth.     • NovoLOG FlexPen 100 UNIT/ML solution pen-injector sc pen INJECT 14 UNITS INTO THE SKIN BEDFORE MEALS     • omeprazole (priLOSEC) 20 MG capsule Take 1 capsule by mouth.     • ondansetron ODT (ZOFRAN-ODT) 4 MG disintegrating tablet Place 1 tablet on the tongue Every 8 (Eight) Hours As Needed for Nausea or Vomiting for up to 6 doses. 6 tablet 0   • OneTouch Verio test strip TEST TWO TIMES A DAY     • Ozempic, 0.25 or 0.5 MG/DOSE, 2 MG/1.5ML  "solution pen-injector      • pravastatin (PRAVACHOL) 20 MG tablet Take 1 tablet by mouth Daily.       No current facility-administered medications on file prior to visit.      No Known Allergies     Review of Systems   Constitutional: Negative.    HENT: Negative.    Eyes: Negative.    Respiratory: Negative.    Cardiovascular: Negative.    Gastrointestinal: Negative.    Endocrine: Negative.    Genitourinary: Negative.    Musculoskeletal: Positive for arthralgias.   Skin: Negative.    Allergic/Immunologic: Negative.    Neurological: Negative.    Hematological: Negative.    Psychiatric/Behavioral: Negative.         The patient's Review of Systems was personally reviewed and confirmed as accurate.    Physical Exam  Blood pressure 148/96, height 172.7 cm (67.99\"), weight 117 kg (259 lb).    Body mass index is 39.39 kg/m².    GENERAL APPEARANCE: awake, alert, oriented, in no acute distress, well developed, well nourished and obese  LUNGS:  breathing nonlabored  EXTREMITIES: no clubbing, cyanosis  PERIPHERAL PULSES: palpable dorsalis pedis and posterior tibial pulses bilaterally.    GAIT:  Antalgic        ----------  Right Knee Exam:  ----------  ALIGNMENT: severe varus, correctable to neutral  ----------  RANGE OF MOTION:  Decreased (10 - 115 degrees) with no extensor lag  LIGAMENTOUS STABILITY:   stable to varus and valgus stress at terminal extension and 30 degrees; retensioning of the MCL is appreciated with valgus stress at 30 degrees consistent with medial compartment degeneration  ----------  STRENGTH:  KNEE FLEXION 4/5  KNEE EXTENSION  4/5  ANKLE DORSIFLEXION  5/5  ANKLE PLANTARFLEXION  5/5  ----------  PAIN WITH PALPATION:global  KNEE EFFUSION: yes,  mild effusion  PAIN WITH KNEE ROM: yes  PATELLAR CREPITUS:  yes, painful and symptomatic  ----------  SENSATION TO LIGHT TOUCH:  DEEP PERONEAL/SUPERFICIAL " PERONEAL/SURAL/SAPHENOUS/TIBIAL:    intact  ----------  EDEMA:  no  ERYTHEMA:    no  WOUNDS/INCISIONS:   no  _____________________________________________________________________  _____________________________________________________________________    RADIOGRAPHIC FINDINGS:   No new imaging today    Assessment/Plan:   Diagnosis Plan   1. Post-traumatic osteoarthritis of right knee        2. Class 2 obesity due to excess calories without serious comorbidity with body mass index (BMI) of 39.0 to 39.9 in adult          The patient has failed conservative treatment measures and is a candidate for joint arthroplasty.  I discussed the joint arthroplasty surgical process as well as the recovery and rehabilitation time frame.  The patient asked several questions regarding the joint arthroplasty surgery, which were answered accordingly.  Ultimately, the patient declines surgical intervention at this time and wishes to continue with conservative treatment measures.  Alternative conservative treatment measures were discussed including bracing, therapy, topical/oral anti-inflammatories, activity modification, and weight loss.  The patient considered these treatment options and wishes to proceed with corticosteroid injection(s) today.  Therefore we will proceed with corticosteroid injection(s) today.  Follow-up 3 months for repeat evaluation with x-ray 4 views right knee on return.  At that time we will likely discuss proceeding with surgical intervention provided he has met his weight loss goals.    Patient has an elevated BMI. The patient has been instructed on various weight loss avenues including diet, portion control, calorie restriction, low/no impact exercise, referral to weight loss management and/or bariatric surgery.  It was explained that weight loss can improve joint pain alone by decreasing the joint reaction forces.  For every pound of weight change, the knee and hip joints see a 4 to 5 fold change in pressure.   Given these options, the patient will proceed with low calorie diet and low impact exercise.    Procedure Note:  I discussed with the patient the potential benefits of performing a therapeutic injection of the right knee as well as potential risks including but not limited to infection, swelling, pain, bleeding, bruising, nerve/vessel damage, skin color changes, transient elevation in blood glucose levels, and fat atrophy. After informed consent and verifying correct patient, procedure site, and type of procedure, the area was prepped with alcohol, ethyl chloride was used to numb the skin. Via the superolateral approach, 3 cc of 1% lidocaine, 3 cc of 0.25% Marcaine and 2 cc of 40mg/ml of Kenalog were injected into the right knee. The patient tolerated the procedure well. There were no complications. A sterile dressing was placed over the injection site.        Aguila Campos MD  03/02/23  11:21 EST

## 2023-05-04 ENCOUNTER — OFFICE VISIT (OUTPATIENT)
Dept: ORTHOPEDIC SURGERY | Facility: CLINIC | Age: 58
End: 2023-05-04
Payer: COMMERCIAL

## 2023-05-04 VITALS
DIASTOLIC BLOOD PRESSURE: 92 MMHG | HEIGHT: 68 IN | SYSTOLIC BLOOD PRESSURE: 168 MMHG | BODY MASS INDEX: 40.18 KG/M2 | WEIGHT: 265.1 LBS

## 2023-05-04 DIAGNOSIS — E66.09 CLASS 2 OBESITY DUE TO EXCESS CALORIES WITHOUT SERIOUS COMORBIDITY WITH BODY MASS INDEX (BMI) OF 39.0 TO 39.9 IN ADULT: ICD-10-CM

## 2023-05-04 DIAGNOSIS — M17.31 POST-TRAUMATIC OSTEOARTHRITIS OF RIGHT KNEE: Primary | ICD-10-CM

## 2023-05-04 RX ORDER — MELOXICAM 15 MG/1
TABLET ORAL
Qty: 90 TABLET | Refills: 1 | Status: SHIPPED | OUTPATIENT
Start: 2023-05-04

## 2023-05-04 NOTE — PROGRESS NOTES
Orthopaedic Clinic Note: Knee Established Patient    Chief Complaint   Patient presents with   • Follow-up     9 week follow up --- Post-traumatic osteoarthritis of right knee        HPI    It has been 9  week(s) since Mr. Esparza's last visit. He returns to clinic today for follow-up right knee osteoarthritis.  He underwent cortisone injection the right knee 2 months ago.  The injection provided about 2 to 3 days of relief.  Pain has returned.  Rates his pain a 5/10 on the pain scale.  He is having recurrent swelling and stiffness in the knees and difficulty walking and weightbearing.  Denies fevers chills or constitutional symptoms.  He is continuing to work on weight loss but remains overweight with a BMI 39.85 which is actually up from his last visit.  Denies fevers chills or constitutional symptoms.  In the interval since his last visit, he has ceased nicotine intake.    Past Medical History:   Diagnosis Date   • Acromioclavicular separation    • Anemia    • Arthritis    • Asthma    • Back problem    • Colonic fistula    • Diabetes    • Frozen shoulder    • Gout    • Hypertension    • Knee swelling    • Periarthritis of shoulder    • Rotator cuff syndrome    • Tear of meniscus of knee       Past Surgical History:   Procedure Laterality Date   • APPENDECTOMY  1981   • BACK SURGERY     • COLON RESECTION     • KNEE ACL RECONSTRUCTION     • LAMINECTOMY      14 years.       Family History   Problem Relation Age of Onset   • Cancer Mother    • Hypertension Mother    • Hypertension Father    • Heart disease Father    • Diabetes Father    • Alzheimer's disease Maternal Grandmother    • Heart block Maternal Grandmother    • Cancer Maternal Grandmother    • Heart block Maternal Grandfather    • Diabetes Paternal Grandmother    • Cancer Paternal Grandmother    • Heart attack Paternal Grandfather    • Cancer Maternal Aunt      Social History     Socioeconomic History   • Marital status:    • Number of children: 2    Tobacco Use   • Smoking status: Some Days     Packs/day: 0.25     Years: 0.50     Pack years: 0.13     Types: Cigarettes   • Smokeless tobacco: Never   Substance and Sexual Activity   • Alcohol use: Yes     Alcohol/week: 3.0 - 5.0 standard drinks     Types: 3 - 5 Shots of liquor per week   • Drug use: Not Currently     Types: Marijuana   • Sexual activity: Yes     Partners: Female      Current Outpatient Medications on File Prior to Visit   Medication Sig Dispense Refill   • albuterol sulfate  (90 Base) MCG/ACT inhaler Inhale 2 puffs Every 6 (Six) Hours As Needed.     • amLODIPine (NORVASC) 10 MG tablet Take 1 tablet by mouth.     • aspirin 81 MG chewable tablet Chew 1 tablet.     • atenolol (TENORMIN) 100 MG tablet Take 1 tablet by mouth Daily.     • B-D ULTRAFINE III SHORT PEN 31G X 8 MM misc USE WITH INSULIN ONCE DAILY     • cloNIDine (CATAPRES) 0.2 MG tablet Take  by mouth Every 8 (Eight) Hours.     • ferrous sulfate 325 (65 FE) MG tablet Take 1 tablet by mouth.     • folic acid (FOLVITE) 1 MG tablet Take  by mouth Daily.     • hydroCHLOROthiazide (HYDRODIURIL) 12.5 MG tablet Take 1 tablet by mouth Daily.     • hydroxychloroquine (PLAQUENIL) 200 MG tablet Take 1 tablet by mouth.     • Lantus SoloStar 100 UNIT/ML injection pen INJECT 15 UNITS SUBCUTANEOUSLY AT BEDTIME     • losartan (COZAAR) 50 MG tablet Take 1 tablet by mouth Daily.     • metoprolol succinate XL (TOPROL-XL) 50 MG 24 hr tablet Take 2 tablets by mouth.     • NovoLOG FlexPen 100 UNIT/ML solution pen-injector sc pen INJECT 14 UNITS INTO THE SKIN BEDFORE MEALS     • omeprazole (priLOSEC) 20 MG capsule Take 1 capsule by mouth.     • OneTouch Verio test strip TEST TWO TIMES A DAY     • Ozempic, 0.25 or 0.5 MG/DOSE, 2 MG/1.5ML solution pen-injector      • pravastatin (PRAVACHOL) 20 MG tablet Take 1 tablet by mouth Daily.     • [DISCONTINUED] loperamide (IMODIUM) 2 MG capsule Take 1 capsule by mouth 4 (Four) Times a Day As Needed for Diarrhea.  10 capsule 0   • [DISCONTINUED] ondansetron ODT (ZOFRAN-ODT) 4 MG disintegrating tablet Place 1 tablet on the tongue Every 8 (Eight) Hours As Needed for Nausea or Vomiting for up to 6 doses. 6 tablet 0     No current facility-administered medications on file prior to visit.      No Known Allergies     Review of Systems   Constitutional: Negative for activity change, appetite change, chills, diaphoresis, fatigue, fever and unexpected weight change.   HENT: Negative for congestion, dental problem, drooling, ear discharge, ear pain, facial swelling, hearing loss, mouth sores, nosebleeds, postnasal drip, rhinorrhea, sinus pressure, sneezing, sore throat, tinnitus, trouble swallowing and voice change.    Eyes: Negative for photophobia, pain, discharge, redness, itching and visual disturbance.   Respiratory: Negative for apnea, cough, choking, chest tightness, shortness of breath, wheezing and stridor.    Cardiovascular: Negative for chest pain, palpitations and leg swelling.   Gastrointestinal: Negative for abdominal distention, abdominal pain, anal bleeding, blood in stool, constipation, diarrhea, nausea, rectal pain and vomiting.   Endocrine: Negative for cold intolerance, heat intolerance, polydipsia, polyphagia and polyuria.   Genitourinary: Negative for decreased urine volume, difficulty urinating, dysuria, enuresis, flank pain, frequency, genital sores, hematuria and urgency.   Musculoskeletal: Positive for arthralgias. Negative for back pain, gait problem, joint swelling, myalgias, neck pain and neck stiffness.   Skin: Negative for color change, pallor, rash and wound.   Allergic/Immunologic: Negative for environmental allergies, food allergies and immunocompromised state.   Neurological: Negative for dizziness, tremors, seizures, syncope, facial asymmetry, speech difficulty, weakness, light-headedness, numbness and headaches.   Hematological: Negative for adenopathy. Does not bruise/bleed easily.  "  Psychiatric/Behavioral: Negative for agitation, behavioral problems, confusion, decreased concentration, dysphoric mood, hallucinations, self-injury, sleep disturbance and suicidal ideas. The patient is not nervous/anxious and is not hyperactive.         The patient's Review of Systems was personally reviewed and confirmed as accurate.    Physical Exam  Blood pressure 168/92, height 173.7 cm (68.39\"), weight 120 kg (265 lb 1.6 oz).    Body mass index is 39.85 kg/m².    GENERAL APPEARANCE: awake, alert, oriented, in no acute distress, well developed, well nourished and obese  LUNGS:  breathing nonlabored  EXTREMITIES: no clubbing, cyanosis  PERIPHERAL PULSES: palpable dorsalis pedis and posterior tibial pulses bilaterally.    GAIT:  Antalgic        ----------  Right Knee Exam:  ----------  ALIGNMENT: severe varus, correctable to neutral  ----------  RANGE OF MOTION:  Decreased (10 - 115 degrees) with no extensor lag  LIGAMENTOUS STABILITY:   stable to varus and valgus stress at terminal extension and 30 degrees; retensioning of the MCL is appreciated with valgus stress at 30 degrees consistent with medial compartment degeneration  ----------  STRENGTH:  KNEE FLEXION 4/5  KNEE EXTENSION  4/5  ANKLE DORSIFLEXION  5/5  ANKLE PLANTARFLEXION  5/5  ----------  PAIN WITH PALPATION:global  KNEE EFFUSION: yes,  mild effusion  PAIN WITH KNEE ROM: yes  PATELLAR CREPITUS:  yes, painful and symptomatic  ----------  SENSATION TO LIGHT TOUCH:  DEEP PERONEAL/SUPERFICIAL PERONEAL/SURAL/SAPHENOUS/TIBIAL:    intact  ----------  EDEMA: 1+ edema to mid tibia  ERYTHEMA:    no  WOUNDS/INCISIONS:   no  _____________________________________________________________________  _____________________________________________________________________    RADIOGRAPHIC FINDINGS:   Indication: Right knee pain    Comparison: Todays xrays were compared to previous xrays from 12/1/2022    Knee films: moderate to severe tricompartmental arthritis with genu " varum alignment and bone-on-bone articulation medial compartment, periarticular osteophytes visualized in all compartments and No significant changes compared to prior radiographs.    Assessment/Plan:   Diagnosis Plan   1. Post-traumatic osteoarthritis of right knee  XR Knee 4+ View Right    meloxicam (MOBIC) 15 MG tablet      2. Class 2 obesity due to excess calories without serious comorbidity with body mass index (BMI) of 39.0 to 39.9 in adult          Patient has end-stage arthritis of the right knee.  He is not ready to proceed to surgical intervention as he wishes to continue working on weight loss.  I discussed alternative interventions.  He is agreeable to prescription anti-inflammatory.  Meloxicam prescribed.  He will follow-up in 2 months for repeat assessment.    Patient has an elevated BMI. The patient has been instructed on various weight loss avenues including diet, portion control, calorie restriction, low/no impact exercise, referral to weight loss management and/or bariatric surgery.  It was explained that weight loss can improve joint pain alone by decreasing the joint reaction forces.  For every pound of weight change, the knee and hip joints see a 4 to 5 fold change in pressure.  Given these options, the patient will proceed with low calorie diet and low impact exercise.      Aguila Campos MD  05/04/23  12:03 EDT

## 2023-12-07 DIAGNOSIS — M17.31 POST-TRAUMATIC OSTEOARTHRITIS OF RIGHT KNEE: ICD-10-CM

## 2023-12-07 RX ORDER — MELOXICAM 15 MG/1
TABLET ORAL
Qty: 90 TABLET | Refills: 1 | Status: SHIPPED | OUTPATIENT
Start: 2023-12-07

## 2024-03-14 ENCOUNTER — OFFICE VISIT (OUTPATIENT)
Dept: ORTHOPEDIC SURGERY | Facility: CLINIC | Age: 59
End: 2024-03-14
Payer: COMMERCIAL

## 2024-03-14 VITALS
HEIGHT: 68 IN | DIASTOLIC BLOOD PRESSURE: 80 MMHG | BODY MASS INDEX: 38.52 KG/M2 | WEIGHT: 254.2 LBS | SYSTOLIC BLOOD PRESSURE: 142 MMHG

## 2024-03-14 DIAGNOSIS — M17.31 POST-TRAUMATIC OSTEOARTHRITIS OF RIGHT KNEE: Primary | ICD-10-CM

## 2024-03-14 DIAGNOSIS — E66.09 CLASS 2 OBESITY DUE TO EXCESS CALORIES WITHOUT SERIOUS COMORBIDITY WITH BODY MASS INDEX (BMI) OF 39.0 TO 39.9 IN ADULT: ICD-10-CM

## 2024-03-14 RX ORDER — ACETAMINOPHEN AND CODEINE PHOSPHATE 300; 30 MG/1; MG/1
TABLET ORAL
COMMUNITY
Start: 2024-01-26

## 2024-03-14 RX ORDER — OMEPRAZOLE 40 MG/1
40 CAPSULE, DELAYED RELEASE ORAL DAILY
COMMUNITY
Start: 2024-02-02

## 2024-03-14 RX ORDER — BLOOD-GLUCOSE METER
EACH MISCELLANEOUS SEE ADMIN INSTRUCTIONS
COMMUNITY
Start: 2024-03-07

## 2024-03-14 RX ORDER — LIDOCAINE HYDROCHLORIDE 10 MG/ML
3 INJECTION, SOLUTION EPIDURAL; INFILTRATION; INTRACAUDAL; PERINEURAL
Status: COMPLETED | OUTPATIENT
Start: 2024-03-14 | End: 2024-03-14

## 2024-03-14 RX ORDER — SEMAGLUTIDE 2.68 MG/ML
2 INJECTION, SOLUTION SUBCUTANEOUS
COMMUNITY
Start: 2024-03-07

## 2024-03-14 RX ORDER — BUPIVACAINE HYDROCHLORIDE 2.5 MG/ML
3 INJECTION, SOLUTION EPIDURAL; INFILTRATION; INTRACAUDAL
Status: COMPLETED | OUTPATIENT
Start: 2024-03-14 | End: 2024-03-14

## 2024-03-14 RX ORDER — TRIAMCINOLONE ACETONIDE 40 MG/ML
80 INJECTION, SUSPENSION INTRA-ARTICULAR; INTRAMUSCULAR
Status: COMPLETED | OUTPATIENT
Start: 2024-03-14 | End: 2024-03-14

## 2024-03-14 RX ADMIN — LIDOCAINE HYDROCHLORIDE 3 ML: 10 INJECTION, SOLUTION EPIDURAL; INFILTRATION; INTRACAUDAL; PERINEURAL at 09:56

## 2024-03-14 RX ADMIN — BUPIVACAINE HYDROCHLORIDE 3 ML: 2.5 INJECTION, SOLUTION EPIDURAL; INFILTRATION; INTRACAUDAL at 09:56

## 2024-03-14 RX ADMIN — TRIAMCINOLONE ACETONIDE 80 MG: 40 INJECTION, SUSPENSION INTRA-ARTICULAR; INTRAMUSCULAR at 09:56

## 2024-03-14 NOTE — PROGRESS NOTES
Orthopaedic Clinic Note: Knee Established Patient    Chief Complaint   Patient presents with    Follow-up     8 month follow up-   Post-traumatic osteoarthritis of right knee             HPI    It has been 8  month(s) since Mr. Esparza's last visit. He returns to clinic today for post-traumatic osteoarthritis of right knee.  He underwent cortisone injection in the right knee 8 months ago.  The injection provided 4 weeks of relief.  His pain has gradually returned.  He comes clinic today rating his pain a 3/10 on the pain scale but with physical activity increases to a 9/10 on the pain scale.  He is ambulating with no assistive device.  Denies fevers chills or constitutional symptoms.  Overall he is doing about the same.  He remains overweight with a BMI 39.2.     Past Medical History:   Diagnosis Date    Acromioclavicular separation     Anemia     Arthritis     Asthma     Back problem     Colonic fistula     Diabetes     Frozen shoulder     Gout     Hypertension     Knee swelling     Periarthritis of shoulder     Rotator cuff syndrome     Tear of meniscus of knee       Past Surgical History:   Procedure Laterality Date    APPENDECTOMY  1981    BACK SURGERY      COLON RESECTION      KNEE ACL RECONSTRUCTION      LAMINECTOMY      14 years.       Family History   Problem Relation Age of Onset    Cancer Mother     Hypertension Mother     Hypertension Father     Heart disease Father     Diabetes Father     Alzheimer's disease Maternal Grandmother     Heart block Maternal Grandmother     Cancer Maternal Grandmother     Heart block Maternal Grandfather     Diabetes Paternal Grandmother     Cancer Paternal Grandmother     Heart attack Paternal Grandfather     Cancer Maternal Aunt      Social History     Socioeconomic History    Marital status:     Number of children: 2   Tobacco Use    Smoking status: Some Days     Current packs/day: 0.25     Average packs/day: 0.3 packs/day for 0.5 years (0.1 ttl pk-yrs)     Types:  Cigarettes    Smokeless tobacco: Never   Vaping Use    Vaping status: Never Used   Substance and Sexual Activity    Alcohol use: Yes     Alcohol/week: 3.0 - 5.0 standard drinks of alcohol     Types: 3 - 5 Shots of liquor per week    Drug use: Not Currently     Types: Marijuana    Sexual activity: Yes     Partners: Female      Current Outpatient Medications on File Prior to Visit   Medication Sig Dispense Refill    acetaminophen-codeine (TYLENOL with CODEINE #3) 300-30 MG per tablet TAKE 1 TABLET BY MOUTH EVERY 4 HOURS AS NEEDED FOR PAIN FOR UP TO 3 DAYS. MAX DAILY: 6 TABLETS      albuterol sulfate  (90 Base) MCG/ACT inhaler Inhale 2 puffs Every 6 (Six) Hours As Needed.      amLODIPine (NORVASC) 10 MG tablet Take 1 tablet by mouth.      aspirin 81 MG chewable tablet Chew 1 tablet.      atenolol (TENORMIN) 100 MG tablet Take 1 tablet by mouth Daily.      B-D ULTRAFINE III SHORT PEN 31G X 8 MM misc USE WITH INSULIN ONCE DAILY      Blood Glucose Monitoring Suppl (ONE TOUCH ULTRA 2) w/Device kit See Admin Instructions.      cloNIDine (CATAPRES) 0.2 MG tablet Take  by mouth Every 8 (Eight) Hours.      ferrous sulfate 325 (65 FE) MG tablet Take 1 tablet by mouth.      folic acid (FOLVITE) 1 MG tablet Take  by mouth Daily.      hydroCHLOROthiazide (HYDRODIURIL) 12.5 MG tablet Take 1 tablet by mouth Daily.      hydroxychloroquine (PLAQUENIL) 200 MG tablet Take 1 tablet by mouth.      Lantus SoloStar 100 UNIT/ML injection pen INJECT 15 UNITS SUBCUTANEOUSLY AT BEDTIME      losartan (COZAAR) 50 MG tablet Take 1 tablet by mouth Daily.      meloxicam (MOBIC) 15 MG tablet TAKE 1 TABLET BY MOUTH EVERY DAY WITH FOOD 90 tablet 1    metoprolol succinate XL (TOPROL-XL) 50 MG 24 hr tablet Take 2 tablets by mouth.      NovoLOG FlexPen 100 UNIT/ML solution pen-injector sc pen INJECT 14 UNITS INTO THE SKIN BEDFORE MEALS      omeprazole (priLOSEC) 40 MG capsule Take 1 capsule by mouth Daily.      OneTouch Verio test strip TEST TWO  "TIMES A DAY      pravastatin (PRAVACHOL) 20 MG tablet Take 1 tablet by mouth Daily.      pregabalin (LYRICA) 50 MG capsule PLEASE SEE ATTACHED FOR DETAILED DIRECTIONS      Semaglutide, 2 MG/DOSE, (Ozempic, 2 MG/DOSE,) 8 MG/3ML solution pen-injector Inject 2 mg under the skin into the appropriate area as directed.      [DISCONTINUED] omeprazole (priLOSEC) 20 MG capsule Take 1 capsule by mouth.      [DISCONTINUED] Ozempic, 0.25 or 0.5 MG/DOSE, 2 MG/1.5ML solution pen-injector        No current facility-administered medications on file prior to visit.      No Known Allergies     Review of Systems   Constitutional: Negative.    HENT: Negative.     Eyes: Negative.    Respiratory: Negative.     Cardiovascular: Negative.    Gastrointestinal: Negative.    Endocrine: Negative.    Genitourinary: Negative.    Musculoskeletal:  Positive for arthralgias.   Skin: Negative.    Allergic/Immunologic: Negative.    Neurological: Negative.    Hematological: Negative.    Psychiatric/Behavioral: Negative.          The patient's Review of Systems was personally reviewed and confirmed as accurate.    Physical Exam  Blood pressure 142/80, height 171.5 cm (67.52\"), weight 115 kg (254 lb 3.2 oz).    Body mass index is 39.2 kg/m².    GENERAL APPEARANCE: awake, alert, oriented, in no acute distress, well developed, well nourished, and obese  LUNGS:  breathing nonlabored  EXTREMITIES: no clubbing, cyanosis  PERIPHERAL PULSES: palpable dorsalis pedis and posterior tibial pulses bilaterally.    GAIT:  Antalgic        ----------  Right Knee Exam:  ----------  ALIGNMENT: severe varus, correctable to neutral  ----------  RANGE OF MOTION:  Decreased (10 - 115 degrees) with no extensor lag  LIGAMENTOUS STABILITY:   stable to varus and valgus stress at terminal extension and 30 degrees; retensioning of the MCL is appreciated with valgus stress at 30 degrees consistent with medial compartment degeneration  ----------  STRENGTH:  KNEE FLEXION 4/5  KNEE " EXTENSION  4/5  ANKLE DORSIFLEXION  5/5  ANKLE PLANTARFLEXION  5/5  ----------  PAIN WITH PALPATION:global  KNEE EFFUSION: yes,  mild effusion  PAIN WITH KNEE ROM: yes  PATELLAR CREPITUS:  yes, painful and symptomatic  ----------  SENSATION TO LIGHT TOUCH:  DEEP PERONEAL/SUPERFICIAL PERONEAL/SURAL/SAPHENOUS/TIBIAL:    intact  ----------  EDEMA: 1+ edema to mid tibia  ERYTHEMA:    no  WOUNDS/INCISIONS:   no   _____________________________________________________________________  _____________________________________________________________________    RADIOGRAPHIC FINDINGS:   Indication: Right knee pain    Comparison: Todays xrays were compared to previous xrays from 5/4/2023    Knee films: moderate to severe tricompartmental arthritis with genu varum alignment with bone-on-bone articulation medial compartment, periarticular osteophytes visualized in all compartments and No significant changes compared to prior radiographs.  There is retained hardware in the distal femur and proximal tibia from prior ACL reconstruction.    Assessment/Plan:   Diagnosis Plan   1. Post-traumatic osteoarthritis of right knee  XR Knee 4+ View Right      2. Class 2 obesity due to excess calories without serious comorbidity with body mass index (BMI) of 39.0 to 39.9 in adult          The patient has failed conservative treatment measures and is a candidate for joint arthroplasty.  I discussed the joint arthroplasty surgical process as well as the recovery and rehabilitation time frame.  The patient asked several questions regarding the joint arthroplasty surgery, which were answered accordingly.  Ultimately, the patient declines surgical intervention at this time and wishes to continue with conservative treatment measures.  Alternative conservative treatment measures were discussed including bracing, therapy, topical/oral anti-inflammatories, activity modification, and weight loss.  The patient considered these treatment options and wishes  to proceed with corticosteroid injection(s) today.  Therefore we will proceed with corticosteroid injection(s) today.  Follow-up in 2 months for scheduling of right total knee arthroplasty.    Patient has an elevated BMI. The patient has been instructed on various weight loss avenues including diet, portion control, calorie restriction, low/no impact exercise, referral to weight loss management and/or bariatric surgery.  It was explained that weight loss can improve joint pain alone by decreasing the joint reaction forces.  For every pound of weight change, the knee and hip joints see a 4 to 5 fold change in pressure.  Given these options, the patient will proceed with low calorie diet and low impact exercise.    Procedure Note:  I discussed with the patient the potential benefits of performing a therapeutic injection of the right knee as well as potential risks including but not limited to infection, swelling, pain, bleeding, bruising, nerve/vessel damage, skin color changes, transient elevation in blood glucose levels, and fat atrophy. After informed consent and verifying correct patient, procedure site, and type of procedure, the area was prepped with alcohol, ethyl chloride was used to numb the skin. Via the superolateral approach, 3 cc of 1% lidocaine, 3 cc of 0.25% Marcaine and 2 cc of 40mg/ml of Kenalog were injected into the right knee. The patient tolerated the procedure well. There were no complications. A sterile dressing was placed over the injection site.        Aguila Campos MD  03/14/24  10:07 EDT

## 2024-03-14 NOTE — PROGRESS NOTES
Procedure   - Large Joint Arthrocentesis: R knee on 3/14/2024 9:56 AM  Indications: pain  Details: 21 G needle, superolateral approach  Medications: 3 mL lidocaine PF 1% 1 %; 3 mL bupivacaine (PF) 0.25 %; 80 mg triamcinolone acetonide 40 MG/ML  Outcome: tolerated well, no immediate complications  Procedure, treatment alternatives, risks and benefits explained, specific risks discussed. Consent was given by the patient. Immediately prior to procedure a time out was called to verify the correct patient, procedure, equipment, support staff and site/side marked as required. Patient was prepped and draped in the usual sterile fashion.

## 2024-03-30 DIAGNOSIS — M17.31 POST-TRAUMATIC OSTEOARTHRITIS OF RIGHT KNEE: ICD-10-CM

## 2024-04-01 RX ORDER — MELOXICAM 15 MG/1
TABLET ORAL
Qty: 90 TABLET | Refills: 1 | Status: SHIPPED | OUTPATIENT
Start: 2024-04-01

## 2024-04-18 RX ORDER — METHOCARBAMOL 500 MG/1
TABLET, FILM COATED ORAL
Qty: 40 TABLET | Refills: 1 | OUTPATIENT
Start: 2024-04-18

## 2024-04-18 NOTE — TELEPHONE ENCOUNTER
Medication not appropriate at this time.  He has not seen Dr. Gallardo for this issue in over 1 year.  Thank you.

## 2024-05-16 ENCOUNTER — OFFICE VISIT (OUTPATIENT)
Dept: ORTHOPEDIC SURGERY | Facility: CLINIC | Age: 59
End: 2024-05-16
Payer: COMMERCIAL

## 2024-05-16 VITALS
WEIGHT: 248.4 LBS | SYSTOLIC BLOOD PRESSURE: 128 MMHG | BODY MASS INDEX: 37.65 KG/M2 | DIASTOLIC BLOOD PRESSURE: 86 MMHG | HEIGHT: 68 IN

## 2024-05-16 DIAGNOSIS — M17.31 POST-TRAUMATIC OSTEOARTHRITIS OF RIGHT KNEE: Primary | ICD-10-CM

## 2024-05-16 PROBLEM — M17.10 ARTHRITIS OF KNEE: Status: ACTIVE | Noted: 2024-05-16

## 2024-05-16 PROCEDURE — 1159F MED LIST DOCD IN RCRD: CPT | Performed by: ORTHOPAEDIC SURGERY

## 2024-05-16 PROCEDURE — 1160F RVW MEDS BY RX/DR IN RCRD: CPT | Performed by: ORTHOPAEDIC SURGERY

## 2024-05-16 PROCEDURE — 99214 OFFICE O/P EST MOD 30 MIN: CPT | Performed by: ORTHOPAEDIC SURGERY

## 2024-05-16 RX ORDER — MELOXICAM 7.5 MG/1
15 TABLET ORAL ONCE
OUTPATIENT
Start: 2024-05-16 | End: 2024-05-16

## 2024-05-16 RX ORDER — CHLORHEXIDINE GLUCONATE 40 MG/ML
SOLUTION TOPICAL
Qty: 236 ML | Refills: 0 | Status: SHIPPED | OUTPATIENT
Start: 2024-05-16

## 2024-05-16 RX ORDER — PREGABALIN 75 MG/1
75 CAPSULE ORAL ONCE
OUTPATIENT
Start: 2024-05-16 | End: 2024-05-16

## 2024-05-16 RX ORDER — ACETAMINOPHEN 325 MG/1
1000 TABLET ORAL ONCE
OUTPATIENT
Start: 2024-05-16 | End: 2024-05-16

## 2024-05-16 NOTE — PROGRESS NOTES
Orthopaedic Clinic Note: Knee Established Patient    Chief Complaint   Patient presents with    Follow-up     2 month follow up - Post-traumatic osteoarthritis of right knee         HPI    It has been 2  month(s) since Mr. Esparza's last visit. He returns to clinic today for follow-up right knee osteoarthritis.  Patient states nicotine intake 2 months ago.  He is ready to proceed to scheduling total knee arthroplasty for his end-stage right knee arthritis.  He rates his pain a 6/10 on the pain scale.  The injection 2 months ago provided about 4 to 5 weeks of relief before his pain returned.    Past Medical History:   Diagnosis Date    Acromioclavicular separation     Anemia     Arthritis     Asthma     Back problem     Colonic fistula     Diabetes     Frozen shoulder     Gout     Hypertension     Knee swelling     Periarthritis of shoulder     Rotator cuff syndrome     Tear of meniscus of knee       Past Surgical History:   Procedure Laterality Date    APPENDECTOMY      BACK SURGERY      COLON RESECTION      KNEE ACL RECONSTRUCTION      LAMINECTOMY      14 years.       Family History   Problem Relation Age of Onset    Cancer Mother     Hypertension Mother     Hypertension Father     Heart disease Father     Diabetes Father     Alzheimer's disease Maternal Grandmother     Heart block Maternal Grandmother     Cancer Maternal Grandmother     Heart block Maternal Grandfather     Diabetes Paternal Grandmother     Cancer Paternal Grandmother     Heart attack Paternal Grandfather     Cancer Maternal Aunt      Social History     Socioeconomic History    Marital status:     Number of children: 2   Tobacco Use    Smoking status: Former     Average packs/day: 0.3 packs/day for 0.5 years (0.1 ttl pk-yrs)     Types: Cigarettes     Start date:      Quit date: 2019     Years since quittin.3    Smokeless tobacco: Never   Vaping Use    Vaping status: Never Used   Substance and Sexual Activity    Alcohol use: Yes      Alcohol/week: 3.0 - 5.0 standard drinks of alcohol     Types: 3 - 5 Shots of liquor per week    Drug use: Not Currently     Types: Marijuana    Sexual activity: Yes     Partners: Female      Current Outpatient Medications on File Prior to Visit   Medication Sig Dispense Refill    acetaminophen-codeine (TYLENOL with CODEINE #3) 300-30 MG per tablet TAKE 1 TABLET BY MOUTH EVERY 4 HOURS AS NEEDED FOR PAIN FOR UP TO 3 DAYS. MAX DAILY: 6 TABLETS      albuterol sulfate  (90 Base) MCG/ACT inhaler Inhale 2 puffs Every 6 (Six) Hours As Needed.      amLODIPine (NORVASC) 10 MG tablet Take 1 tablet by mouth.      aspirin 81 MG chewable tablet Chew 1 tablet.      atenolol (TENORMIN) 100 MG tablet Take 1 tablet by mouth Daily.      B-D ULTRAFINE III SHORT PEN 31G X 8 MM misc USE WITH INSULIN ONCE DAILY      Blood Glucose Monitoring Suppl (ONE TOUCH ULTRA 2) w/Device kit See Admin Instructions.      cloNIDine (CATAPRES) 0.2 MG tablet Take  by mouth Every 8 (Eight) Hours.      ferrous sulfate 325 (65 FE) MG tablet Take 1 tablet by mouth.      folic acid (FOLVITE) 1 MG tablet Take  by mouth Daily.      hydroCHLOROthiazide (HYDRODIURIL) 12.5 MG tablet Take 1 tablet by mouth Daily.      hydroxychloroquine (PLAQUENIL) 200 MG tablet Take 1 tablet by mouth.      Lantus SoloStar 100 UNIT/ML injection pen INJECT 15 UNITS SUBCUTANEOUSLY AT BEDTIME      losartan (COZAAR) 50 MG tablet Take 1 tablet by mouth Daily.      meloxicam (MOBIC) 15 MG tablet TAKE 1 TABLET BY MOUTH EVERY DAY WITH FOOD 90 tablet 1    metoprolol succinate XL (TOPROL-XL) 50 MG 24 hr tablet Take 2 tablets by mouth.      NovoLOG FlexPen 100 UNIT/ML solution pen-injector sc pen INJECT 14 UNITS INTO THE SKIN BEDFORE MEALS      omeprazole (priLOSEC) 40 MG capsule Take 1 capsule by mouth Daily.      OneTouch Verio test strip TEST TWO TIMES A DAY      pravastatin (PRAVACHOL) 20 MG tablet Take 1 tablet by mouth Daily.      pregabalin (LYRICA) 50 MG capsule PLEASE SEE  "ATTACHED FOR DETAILED DIRECTIONS      Semaglutide, 2 MG/DOSE, (Ozempic, 2 MG/DOSE,) 8 MG/3ML solution pen-injector Inject 2 mg under the skin into the appropriate area as directed.       No current facility-administered medications on file prior to visit.      No Known Allergies     Review of Systems   Constitutional: Negative.    HENT: Negative.     Eyes: Negative.    Respiratory: Negative.     Cardiovascular: Negative.    Gastrointestinal: Negative.    Endocrine: Negative.    Genitourinary: Negative.    Musculoskeletal:  Positive for arthralgias.   Skin: Negative.    Allergic/Immunologic: Negative.    Neurological: Negative.    Hematological: Negative.    Psychiatric/Behavioral: Negative.          The patient's Review of Systems was personally reviewed and confirmed as accurate.    Physical Exam  Blood pressure 128/86, height 171.5 cm (67.52\"), weight 113 kg (248 lb 6.4 oz).    Body mass index is 38.31 kg/m².    GENERAL APPEARANCE: awake, alert, oriented, in no acute distress and well developed, well nourished  LUNGS:  breathing nonlabored  EXTREMITIES: no clubbing, cyanosis  PERIPHERAL PULSES: palpable dorsalis pedis and posterior tibial pulses bilaterally.    GAIT:  Antalgic        ----------  Right Knee Exam:  ----------  ALIGNMENT: severe varus, correctable to neutral  ----------  RANGE OF MOTION:  Decreased (10 - 115 degrees) with no extensor lag  LIGAMENTOUS STABILITY:   stable to varus and valgus stress at terminal extension and 30 degrees; retensioning of the MCL is appreciated with valgus stress at 30 degrees consistent with medial compartment degeneration  ----------  STRENGTH:  KNEE FLEXION 4/5  KNEE EXTENSION  4/5  ANKLE DORSIFLEXION  5/5  ANKLE PLANTARFLEXION  5/5  ----------  PAIN WITH PALPATION:global  KNEE EFFUSION: yes,  mild effusion  PAIN WITH KNEE ROM: yes  PATELLAR CREPITUS:  yes, painful and symptomatic  ----------  SENSATION TO LIGHT TOUCH:  DEEP PERONEAL/SUPERFICIAL " PERONEAL/SURAL/SAPHENOUS/TIBIAL:    intact  ----------  EDEMA: 1+ edema to mid tibia  ERYTHEMA:    no  WOUNDS/INCISIONS:   no   _____________________________________________________________________  _____________________________________________________________________    RADIOGRAPHIC FINDINGS:   No new imaging today.  Prior imaging of the right knee from 3/14/2024 revealed end-stage osteoarthritis of the right knee with bone-on-bone articulation medial compartment (Kellgren-Tanmay grade 4).  Retained hardware in the proximal tibia is identified from prior ACL reconstruction.    Assessment/Plan:   Diagnosis Plan   1. Post-traumatic osteoarthritis of right knee  Case Request    CBC and Differential    Comprehensive metabolic panel    Protime-INR    APTT    Hemoglobin A1c    ECG 12 Lead    Nicotine & Metabolite, Quant    Tranexamic Acid 1,000 mg in sodium chloride 0.9 % 100 mL    Tranexamic Acid 1,000 mg in sodium chloride 0.9 % 100 mL    ethyl alcohol 62 % 2 each    ceFAZolin (ANCEF) 2 g in sodium chloride 0.9 % 100 mL IVPB    acetaminophen (TYLENOL) tablet 975 mg    meloxicam (MOBIC) tablet 15 mg    pregabalin (LYRICA) capsule 75 mg    Case Request    CT Lower Extremity Right Without Contrast        The patient has clinical and radiographic evidence of end-stage right knee joint degeneration. Conservative measures have been tried for 3 months or longer, but have failed to adequately treat or improve the patient's symptoms. Pain is restricting the patient's daily activities as well as quality of life. The recommendation at this time is to proceed with a right total knee arthroplasty with the goal to improve patient function and pain. The risks, benefits, potential complications, and alternatives were discussed with the patient in detail. Risks included but were not limited to bleeding, infection, anesthesia risks, damage to neurovascular structures, osteolysis, aseptic loosening, instability, dislocation, pain,  continued pain, iatrogenic fracture, possible need for future surgery including the potential for amputation, blood clots, myocardial infarction, stroke, and death. Vannesa-operative blood management and the potential for blood transfusion were discussed with risks and options clearly outlined. Specific details of the surgical procedure, hospitalization, recovery, rehabilitation, and long-term precautions were also presented. Pre-operative teaching was provided. Implant/prosthesis selection was outlined, and the many options available were explained; the final choice will be made at the time of the procedure to match the anatomy and condition of the bone, ligaments, tendons, and muscles. Given this instruction, the patient elected to proceed with the right total knee arthroplasty. The patient will be seen by pre-admission testing for pre-operative optimization and risk assessment and will be scheduled for surgery once this is completed.    The patient is considered standard risk for DVT based on patient risk factors and will be placed on aspirin postoperatively for DVT prophylaxis.        Aguila Campos MD  05/16/24  10:27 EDT

## 2024-07-23 ENCOUNTER — OFFICE VISIT (OUTPATIENT)
Dept: UROLOGY | Facility: CLINIC | Age: 59
End: 2024-07-23
Payer: COMMERCIAL

## 2024-07-23 DIAGNOSIS — N43.3 HYDROCELE, UNSPECIFIED HYDROCELE TYPE: Primary | ICD-10-CM

## 2024-07-23 PROCEDURE — 1159F MED LIST DOCD IN RCRD: CPT | Performed by: UROLOGY

## 2024-07-23 PROCEDURE — 99204 OFFICE O/P NEW MOD 45 MIN: CPT | Performed by: UROLOGY

## 2024-07-23 PROCEDURE — 1160F RVW MEDS BY RX/DR IN RCRD: CPT | Performed by: UROLOGY

## 2024-07-23 RX ORDER — SCOLOPAMINE TRANSDERMAL SYSTEM 1 MG/1
1 PATCH, EXTENDED RELEASE TRANSDERMAL CONTINUOUS
OUTPATIENT
Start: 2024-07-23 | End: 2024-07-26

## 2024-07-23 RX ORDER — MELOXICAM 7.5 MG/1
15 TABLET ORAL ONCE
OUTPATIENT
Start: 2024-07-23 | End: 2024-07-23

## 2024-07-23 RX ORDER — GABAPENTIN 100 MG/1
600 CAPSULE ORAL ONCE
OUTPATIENT
Start: 2024-07-23 | End: 2024-07-23

## 2024-07-23 RX ORDER — ACETAMINOPHEN 500 MG
1000 TABLET ORAL ONCE
OUTPATIENT
Start: 2024-07-23 | End: 2024-07-23

## 2024-07-23 NOTE — PROGRESS NOTES
Office Visit New Urology      Patient Name: Zane Esparza  : 1965   MRN: [id number]     Chief Complaint:    Chief Complaint   Patient presents with    Right hydrocele       Referring Provider: Som Rasheed*    History of Present Illness: Zane Esparza is a 59 y.o. male who presents to Urology today for right hydrocele.    He says that over the past few months he has noted swelling of the right scrotum.  This is never happened to him before.  He says that he had a boil on his scrotum that he treated with warm compresses and aspirated some pus out of the skin.  After this he noticed that his scrotum was swelling.  He had an ultrasound obtained that showed a right hydrocele.  This causes him pain each day especially with movement.  Sometimes he sits on the hydrocele.  It is also interrupting his sex life due to the pain.    He has a prior medical history of diabetes, asthma, history of colovesical fistula status post partial colectomy with fistula takedown.      Subjective      Review of System: Review of Systems   Genitourinary:  Negative for decreased urine volume, difficulty urinating, dysuria, enuresis, flank pain, frequency, hematuria and urgency.      I have reviewed the ROS documented by my clinical staff, updated appropriately and I agree. Aj Horowitz MD    Past Medical History:   Past Medical History:   Diagnosis Date    Acromioclavicular separation     Anemia     Arthritis     Asthma     Back problem     Colonic fistula     Diabetes     Frozen shoulder     Gout     Hypertension     Knee swelling     Periarthritis of shoulder     Rotator cuff syndrome     Tear of meniscus of knee        Past Surgical History:   Past Surgical History:   Procedure Laterality Date    APPENDECTOMY  1981    BACK SURGERY      COLON RESECTION      KNEE ACL RECONSTRUCTION      LAMINECTOMY      14 years.        Family History:   Family History   Problem Relation Age of Onset    Cancer Mother      Hypertension Mother     Hypertension Father     Heart disease Father     Diabetes Father     Alzheimer's disease Maternal Grandmother     Heart block Maternal Grandmother     Cancer Maternal Grandmother     Heart block Maternal Grandfather     Diabetes Paternal Grandmother     Cancer Paternal Grandmother     Heart attack Paternal Grandfather     Cancer Maternal Aunt        Social History:   Social History     Socioeconomic History    Marital status:     Number of children: 2   Tobacco Use    Smoking status: Former     Average packs/day: 0.3 packs/day for 0.5 years (0.1 ttl pk-yrs)     Types: Cigarettes     Start date:      Quit date:      Years since quittin.5     Passive exposure: Past    Smokeless tobacco: Never   Vaping Use    Vaping status: Never Used   Substance and Sexual Activity    Alcohol use: Yes     Alcohol/week: 3.0 - 5.0 standard drinks of alcohol     Types: 3 - 5 Shots of liquor per week    Drug use: Not Currently     Types: Marijuana    Sexual activity: Yes     Partners: Female       Medications:     Current Outpatient Medications:     acetaminophen-codeine (TYLENOL with CODEINE #3) 300-30 MG per tablet, TAKE 1 TABLET BY MOUTH EVERY 4 HOURS AS NEEDED FOR PAIN FOR UP TO 3 DAYS. MAX DAILY: 6 TABLETS, Disp: , Rfl:     albuterol sulfate  (90 Base) MCG/ACT inhaler, Inhale 2 puffs Every 6 (Six) Hours As Needed., Disp: , Rfl:     amLODIPine (NORVASC) 10 MG tablet, Take 1 tablet by mouth., Disp: , Rfl:     aspirin 81 MG chewable tablet, Chew 1 tablet., Disp: , Rfl:     atenolol (TENORMIN) 100 MG tablet, Take 1 tablet by mouth Daily., Disp: , Rfl:     B-D ULTRAFINE III SHORT PEN 31G X 8 MM misc, USE WITH INSULIN ONCE DAILY, Disp: , Rfl:     Blood Glucose Monitoring Suppl (ONE TOUCH ULTRA 2) w/Device kit, See Admin Instructions., Disp: , Rfl:     Chlorhexidine Gluconate 4 % solution, Shower daily with hibiclens solution as directed 5 days prior to surgery., Disp: 236 mL, Rfl: 0     cloNIDine (CATAPRES) 0.2 MG tablet, Take  by mouth Every 8 (Eight) Hours., Disp: , Rfl:     ferrous sulfate 325 (65 FE) MG tablet, Take 1 tablet by mouth., Disp: , Rfl:     folic acid (FOLVITE) 1 MG tablet, Take  by mouth Daily., Disp: , Rfl:     hydroCHLOROthiazide (HYDRODIURIL) 12.5 MG tablet, Take 1 tablet by mouth Daily., Disp: , Rfl:     hydroxychloroquine (PLAQUENIL) 200 MG tablet, Take 1 tablet by mouth., Disp: , Rfl:     Lantus SoloStar 100 UNIT/ML injection pen, INJECT 15 UNITS SUBCUTANEOUSLY AT BEDTIME, Disp: , Rfl:     losartan (COZAAR) 50 MG tablet, Take 1 tablet by mouth Daily., Disp: , Rfl:     meloxicam (MOBIC) 15 MG tablet, TAKE 1 TABLET BY MOUTH EVERY DAY WITH FOOD, Disp: 90 tablet, Rfl: 1    metoprolol succinate XL (TOPROL-XL) 50 MG 24 hr tablet, Take 2 tablets by mouth., Disp: , Rfl:     NovoLOG FlexPen 100 UNIT/ML solution pen-injector sc pen, INJECT 14 UNITS INTO THE SKIN BEDFORE MEALS, Disp: , Rfl:     omeprazole (priLOSEC) 40 MG capsule, Take 1 capsule by mouth Daily., Disp: , Rfl:     OneTouch Verio test strip, TEST TWO TIMES A DAY, Disp: , Rfl:     pravastatin (PRAVACHOL) 20 MG tablet, Take 1 tablet by mouth Daily., Disp: , Rfl:     pregabalin (LYRICA) 50 MG capsule, PLEASE SEE ATTACHED FOR DETAILED DIRECTIONS, Disp: , Rfl:     Semaglutide, 2 MG/DOSE, (Ozempic, 2 MG/DOSE,) 8 MG/3ML solution pen-injector, Inject 2 mg under the skin into the appropriate area as directed., Disp: , Rfl:     Allergies:   No Known Allergies          Objective     Physical Exam:   Vital Signs: There were no vitals filed for this visit.  There is no height or weight on file to calculate BMI.     Physical Exam  Constitutional:       Appearance: Normal appearance.   HENT:      Head: Normocephalic and atraumatic.   Eyes:      Pupils: Pupils are equal, round, and reactive to light.   Abdominal:      General: Abdomen is flat.      Palpations: Abdomen is soft.   Musculoskeletal:         General: Normal range of motion.    Skin:     General: Skin is warm and dry.   Neurological:      General: No focal deficit present.      Mental Status: He is alert.   Psychiatric:         Mood and Affect: Mood normal.         Behavior: Behavior normal.         Genitourinary  Penis: uncircumcised penis, glans normal, no penile discharge.  No rashes/lesions.    Testes: Normal left testicle.  Large right hydrocele with right testicle able to be palpated.  The hydrocele measures about 4 to 5 inches largest diameter      Labs:   Brief Urine Lab Results       None                 Lab Results   Component Value Date    GLUCOSE 112 (H) 11/02/2020    CALCIUM 9.8 11/02/2020     11/02/2020    K 4.3 11/02/2020    CO2 26.0 11/02/2020    CL 98 11/02/2020    BUN 15 11/02/2020    CREATININE 0.98 11/02/2020    EGFRIFAFRI 96 11/02/2020    BCR 15.3 11/02/2020    ANIONGAP 14.0 11/02/2020       Lab Results   Component Value Date    WBC 7.40 11/02/2020    HGB 13.8 11/02/2020    HCT 41.9 11/02/2020    MCV 86.0 11/02/2020     11/02/2020       Images:   US scrotum and testicles    Result Date: 6/12/2024  No evidence of testicular mass or torsion. Very large right hydrocele. Images reviewed, interpreted, and dictated by Dr. Loretta Marcelino. Transcribed by Barbara Bucio PA-C.      Measures:   Tobacco:   Zane Esparza  reports that he quit smoking about 5 years ago. His smoking use included cigarettes. He started smoking about 6 months ago. He has a 0.1 pack-year smoking history. He has been exposed to tobacco smoke. He has never used smokeless tobacco. I have educated him on the risk of diseases from using tobacco products such as cancer, COPD, and heart disease.         Urine Incontinence: No history of urinary incontinence    Assessment / Plan      Assessment/Plan:   59 y.o. male is here today for right hydrocele.  This has been growing in size over the last few months.  It is bothersome to him as it causes him pain with activity and is interrupting his  daily life activities.  He is interested in getting this surgically repaired.  We discussed the options for hydrocele including observation, aspiration, and hydrocelectomy.  We discussed that it is unlikely to cause him significant issues in the future but it would be reasonable to be treated as it is causing him daily issues.  We discussed that aspiration is a possibility but has a high likelihood of recurrence.  We discussed that the most definitive treatment would be hydrocelectomy.  He would like to pursue this.  We discussed the risks and benefits of surgery including hematoma, infection, wound breakdown, recurrence.  We will schedule pending patient in OR availability.    -to OR for right hydrocelectomy    Diagnoses and all orders for this visit:    1. Hydrocele, unspecified hydrocele type (Primary)  -     acetaminophen (TYLENOL) tablet 1,000 mg  -     gabapentin (NEURONTIN) capsule 600 mg  -     meloxicam (MOBIC) tablet 15 mg  -     scopolamine patch 1 mg/72 hr  -     Case Request; Standing  -     ceFAZolin (ANCEF) 2,000 mg in sodium chloride 0.9 % 100 mL IVPB  -     Case Request    Other orders  -     Follow Anesthesia Guidelines / Protocol; Future  -     Provide NPO Instructions to Patient; Future  -     Provide Hydration Instructions to Patient; Future  -     Provide Patient With Enhanced Recovery Booklet(s) or Handout; Future  -     Provide Chlorhexidine Skin Prep Wipes and Instructions; Future  -     Obtain Informed Consent; Future  -     Nursing to Order Blood Glucose on all Inpatients >18 years Old with not BMP Ordered; Future  -     Nursing to Place Order for HgbA1c on Adult Patients >18 Years Old (HgbA1c Within One Month of Admission Acceptable); Future  -     Follow Anesthesia Guidelines / Protocol; Standing  -     Provide Patient With Enhanced Recovery Booklet(s) OR Handout; Standing  -     Verify NPO Status; Standing  -     Verify the Time Patient Completed Gatorade / G2; Standing  -     Place  Sequential Compression Device; Standing  -     Maintain Sequential Compression Device; Standing               I spent approximately 45 minutes providing clinical care for this patient; including review of patient's chart and provider documentation, face to face time spent with patient in examination room (obtaining history, performing physical exam, discussing diagnosis and management options), placing orders, and completing patient documentation.     Aj Horowitz MD  [facility] [place]

## 2024-08-19 ENCOUNTER — PRE-ADMISSION TESTING (OUTPATIENT)
Dept: PREADMISSION TESTING | Facility: HOSPITAL | Age: 59
End: 2024-08-19
Payer: COMMERCIAL

## 2024-08-19 VITALS — HEIGHT: 68 IN | WEIGHT: 250.66 LBS | BODY MASS INDEX: 37.99 KG/M2

## 2024-08-19 DIAGNOSIS — M17.31 POST-TRAUMATIC OSTEOARTHRITIS OF RIGHT KNEE: ICD-10-CM

## 2024-08-19 LAB
DEPRECATED RDW RBC AUTO: 42.4 FL (ref 37–54)
ERYTHROCYTE [DISTWIDTH] IN BLOOD BY AUTOMATED COUNT: 15.9 % (ref 12.3–15.4)
GLUCOSE SERPL-MCNC: 146 MG/DL (ref 65–99)
HBA1C MFR BLD: 6 % (ref 4.8–5.6)
HCT VFR BLD AUTO: 42.2 % (ref 37.5–51)
HGB BLD-MCNC: 15.4 G/DL (ref 13–17.7)
MCH RBC QN AUTO: 27.1 PG (ref 26.6–33)
MCHC RBC AUTO-ENTMCNC: 36.5 G/DL (ref 31.5–35.7)
MCV RBC AUTO: 74.2 FL (ref 79–97)
PLATELET # BLD AUTO: 274 10*3/MM3 (ref 140–450)
PMV BLD AUTO: 9.5 FL (ref 6–12)
POTASSIUM SERPL-SCNC: 3.9 MMOL/L (ref 3.5–5.2)
RBC # BLD AUTO: 5.69 10*6/MM3 (ref 4.14–5.8)
WBC NRBC COR # BLD AUTO: 5.01 10*3/MM3 (ref 3.4–10.8)

## 2024-08-19 PROCEDURE — 85027 COMPLETE CBC AUTOMATED: CPT

## 2024-08-19 PROCEDURE — 84132 ASSAY OF SERUM POTASSIUM: CPT

## 2024-08-19 PROCEDURE — 93005 ELECTROCARDIOGRAM TRACING: CPT

## 2024-08-19 PROCEDURE — 82947 ASSAY GLUCOSE BLOOD QUANT: CPT

## 2024-08-19 PROCEDURE — 36415 COLL VENOUS BLD VENIPUNCTURE: CPT

## 2024-08-19 PROCEDURE — 83036 HEMOGLOBIN GLYCOSYLATED A1C: CPT

## 2024-08-19 RX ORDER — PREGABALIN 75 MG/1
75 CAPSULE ORAL 3 TIMES DAILY
COMMUNITY

## 2024-08-19 NOTE — PAT
Pt aware to stop ozempic - ld 15th.     Per Anesthesia Request, patient instructed not to take their ACE/ARB medications on the AM of surgery.    Patient to apply Chlorhexadine wipes  to surgical area (as instructed) the night before procedure and the AM of procedure. Wipes provided.    Patient instructed to drink 20 ounces of Gatorade or Gatorlyte (if diabetic) and it needs to be completed 1 hour (for Main OR patients) or 2 hours (scheduled  section & [facility] patients) before given arrival time for procedure (NO RED Gatorade and NO Gatorade Zero).    Patient verbalized understanding.

## 2024-08-20 LAB
QT INTERVAL: 366 MS
QTC INTERVAL: 400 MS

## 2024-08-25 ENCOUNTER — ANESTHESIA EVENT (OUTPATIENT)
Dept: PERIOP | Facility: HOSPITAL | Age: 59
End: 2024-08-25
Payer: COMMERCIAL

## 2024-08-25 RX ORDER — SODIUM CHLORIDE 9 MG/ML
40 INJECTION, SOLUTION INTRAVENOUS AS NEEDED
Status: CANCELLED | OUTPATIENT
Start: 2024-08-25

## 2024-08-25 RX ORDER — SODIUM CHLORIDE 0.9 % (FLUSH) 0.9 %
10 SYRINGE (ML) INJECTION EVERY 12 HOURS SCHEDULED
Status: CANCELLED | OUTPATIENT
Start: 2024-08-25

## 2024-08-25 RX ORDER — FAMOTIDINE 10 MG/ML
20 INJECTION, SOLUTION INTRAVENOUS ONCE
Status: CANCELLED | OUTPATIENT
Start: 2024-08-25 | End: 2024-08-25

## 2024-08-25 RX ORDER — SODIUM CHLORIDE 0.9 % (FLUSH) 0.9 %
10 SYRINGE (ML) INJECTION AS NEEDED
Status: CANCELLED | OUTPATIENT
Start: 2024-08-25

## 2024-08-25 NOTE — ANESTHESIA PREPROCEDURE EVALUATION
Anesthesia Evaluation     Patient summary reviewed and Nursing notes reviewed   no history of anesthetic complications:   NPO Solid Status: > 8 hours  NPO Liquid Status: > 2 hours           Airway   Mallampati: II  TM distance: >3 FB  Neck ROM: full  No difficulty expected  Dental          Pulmonary - normal exam   (+) a smoker Former, asthma (well controlled, no recent flares),  Cardiovascular - normal exam  Exercise tolerance: good (4-7 METS)    ECG reviewed  Patient on routine beta blocker and Beta blocker given within 24 hours of surgery    (+) hypertension  (-) dysrhythmias, angina, CHF, FALK, cardiac stents    ROS comment: 3/17-osh- There is normal isotope uptake at stress and rest. There is no evidence of    myocardial ischemia or scar.    Normal LV function.    4 beats rapid VT with exercise    Overall findings represent a low risk scan.     3/17-Normal left ventricle size and systolic function with an EF of 60%.   Mild concentric left ventricular hypertrophy. There is an aneurysmal interatrial septum with no evidence of shunting. Trivial pulmonic regurgitation.       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        )  intravenous induction     Anesthetic plan, risks, benefits, and alternatives have been  provided, discussed and informed consent has been obtained with: patient.    Plan discussed with CRNA.    CODE STATUS:

## 2024-08-26 ENCOUNTER — ANESTHESIA (OUTPATIENT)
Dept: PERIOP | Facility: HOSPITAL | Age: 59
End: 2024-08-26
Payer: COMMERCIAL

## 2024-08-26 ENCOUNTER — HOSPITAL ENCOUNTER (OUTPATIENT)
Facility: HOSPITAL | Age: 59
Setting detail: HOSPITAL OUTPATIENT SURGERY
Discharge: HOME | End: 2024-08-26
Attending: UROLOGY | Admitting: UROLOGY
Payer: COMMERCIAL

## 2024-08-26 VITALS
OXYGEN SATURATION: 94 % | WEIGHT: 250 LBS | SYSTOLIC BLOOD PRESSURE: 160 MMHG | TEMPERATURE: 97.2 F | RESPIRATION RATE: 16 BRPM | BODY MASS INDEX: 37.89 KG/M2 | DIASTOLIC BLOOD PRESSURE: 106 MMHG | HEART RATE: 68 BPM | HEIGHT: 68 IN

## 2024-08-26 DIAGNOSIS — N43.3 HYDROCELE, UNSPECIFIED HYDROCELE TYPE: ICD-10-CM

## 2024-08-26 LAB — GLUCOSE BLDC GLUCOMTR-MCNC: 95 MG/DL (ref 70–130)

## 2024-08-26 PROCEDURE — 55040 REMOVAL OF HYDROCELE: CPT | Performed by: UROLOGY

## 2024-08-26 PROCEDURE — 25010000002 HYDROMORPHONE 1 MG/ML SOLUTION

## 2024-08-26 PROCEDURE — 25810000003 LACTATED RINGERS PER 1000 ML: Performed by: ANESTHESIOLOGY

## 2024-08-26 PROCEDURE — S0260 H&P FOR SURGERY: HCPCS | Performed by: PHYSICIAN ASSISTANT

## 2024-08-26 PROCEDURE — 25010000002 DEXAMETHASONE PER 1 MG: Performed by: NURSE ANESTHETIST, CERTIFIED REGISTERED

## 2024-08-26 PROCEDURE — 54512 EXCISE LESION TESTIS: CPT | Performed by: UROLOGY

## 2024-08-26 PROCEDURE — 25010000002 PROPOFOL 10 MG/ML EMULSION: Performed by: NURSE ANESTHETIST, CERTIFIED REGISTERED

## 2024-08-26 PROCEDURE — 54830 REMOVE EPIDIDYMIS LESION: CPT | Performed by: UROLOGY

## 2024-08-26 PROCEDURE — 25010000002 LIDOCAINE 1 % SOLUTION: Performed by: UROLOGY

## 2024-08-26 PROCEDURE — 25010000002 CEFAZOLIN PER 500 MG: Performed by: UROLOGY

## 2024-08-26 PROCEDURE — 25010000002 FENTANYL CITRATE (PF) 100 MCG/2ML SOLUTION: Performed by: NURSE ANESTHETIST, CERTIFIED REGISTERED

## 2024-08-26 PROCEDURE — 82948 REAGENT STRIP/BLOOD GLUCOSE: CPT

## 2024-08-26 PROCEDURE — 25010000002 ONDANSETRON PER 1 MG: Performed by: NURSE ANESTHETIST, CERTIFIED REGISTERED

## 2024-08-26 RX ORDER — FENTANYL CITRATE 50 UG/ML
50 INJECTION, SOLUTION INTRAMUSCULAR; INTRAVENOUS
Status: DISCONTINUED | OUTPATIENT
Start: 2024-08-26 | End: 2024-08-26 | Stop reason: HOSPADM

## 2024-08-26 RX ORDER — MELOXICAM 15 MG/1
15 TABLET ORAL ONCE
Status: COMPLETED | OUTPATIENT
Start: 2024-08-26 | End: 2024-08-26

## 2024-08-26 RX ORDER — LIDOCAINE HYDROCHLORIDE 10 MG/ML
INJECTION, SOLUTION INFILTRATION; PERINEURAL AS NEEDED
Status: DISCONTINUED | OUTPATIENT
Start: 2024-08-26 | End: 2024-08-26 | Stop reason: HOSPADM

## 2024-08-26 RX ORDER — LIDOCAINE HYDROCHLORIDE 10 MG/ML
INJECTION, SOLUTION EPIDURAL; INFILTRATION; INTRACAUDAL; PERINEURAL AS NEEDED
Status: DISCONTINUED | OUTPATIENT
Start: 2024-08-26 | End: 2024-08-26 | Stop reason: SURG

## 2024-08-26 RX ORDER — HYDROMORPHONE HYDROCHLORIDE 1 MG/ML
0.5 INJECTION, SOLUTION INTRAMUSCULAR; INTRAVENOUS; SUBCUTANEOUS
Status: DISCONTINUED | OUTPATIENT
Start: 2024-08-26 | End: 2024-08-26 | Stop reason: HOSPADM

## 2024-08-26 RX ORDER — MAGNESIUM HYDROXIDE 1200 MG/15ML
LIQUID ORAL AS NEEDED
Status: DISCONTINUED | OUTPATIENT
Start: 2024-08-26 | End: 2024-08-26 | Stop reason: HOSPADM

## 2024-08-26 RX ORDER — DROPERIDOL 2.5 MG/ML
0.62 INJECTION, SOLUTION INTRAMUSCULAR; INTRAVENOUS ONCE AS NEEDED
Status: DISCONTINUED | OUTPATIENT
Start: 2024-08-26 | End: 2024-08-26 | Stop reason: HOSPADM

## 2024-08-26 RX ORDER — MIDAZOLAM HYDROCHLORIDE 1 MG/ML
1 INJECTION INTRAMUSCULAR; INTRAVENOUS
Status: DISCONTINUED | OUTPATIENT
Start: 2024-08-26 | End: 2024-08-26 | Stop reason: HOSPADM

## 2024-08-26 RX ORDER — PROPOFOL 10 MG/ML
VIAL (ML) INTRAVENOUS AS NEEDED
Status: DISCONTINUED | OUTPATIENT
Start: 2024-08-26 | End: 2024-08-26 | Stop reason: SURG

## 2024-08-26 RX ORDER — ACETAMINOPHEN 500 MG
1000 TABLET ORAL ONCE
Status: COMPLETED | OUTPATIENT
Start: 2024-08-26 | End: 2024-08-26

## 2024-08-26 RX ORDER — SODIUM CHLORIDE, SODIUM LACTATE, POTASSIUM CHLORIDE, CALCIUM CHLORIDE 600; 310; 30; 20 MG/100ML; MG/100ML; MG/100ML; MG/100ML
9 INJECTION, SOLUTION INTRAVENOUS CONTINUOUS
Status: DISCONTINUED | OUTPATIENT
Start: 2024-08-26 | End: 2024-08-26 | Stop reason: HOSPADM

## 2024-08-26 RX ORDER — SCOLOPAMINE TRANSDERMAL SYSTEM 1 MG/1
1 PATCH, EXTENDED RELEASE TRANSDERMAL CONTINUOUS
Status: DISCONTINUED | OUTPATIENT
Start: 2024-08-26 | End: 2024-08-26 | Stop reason: HOSPADM

## 2024-08-26 RX ORDER — ONDANSETRON 2 MG/ML
INJECTION INTRAMUSCULAR; INTRAVENOUS AS NEEDED
Status: DISCONTINUED | OUTPATIENT
Start: 2024-08-26 | End: 2024-08-26 | Stop reason: SURG

## 2024-08-26 RX ORDER — GABAPENTIN 300 MG/1
600 CAPSULE ORAL ONCE
Status: COMPLETED | OUTPATIENT
Start: 2024-08-26 | End: 2024-08-26

## 2024-08-26 RX ORDER — FENTANYL CITRATE 50 UG/ML
INJECTION, SOLUTION INTRAMUSCULAR; INTRAVENOUS AS NEEDED
Status: DISCONTINUED | OUTPATIENT
Start: 2024-08-26 | End: 2024-08-26 | Stop reason: SURG

## 2024-08-26 RX ORDER — LIDOCAINE HYDROCHLORIDE 10 MG/ML
0.5 INJECTION, SOLUTION EPIDURAL; INFILTRATION; INTRACAUDAL; PERINEURAL ONCE AS NEEDED
Status: COMPLETED | OUTPATIENT
Start: 2024-08-26 | End: 2024-08-26

## 2024-08-26 RX ORDER — DEXAMETHASONE SODIUM PHOSPHATE 4 MG/ML
INJECTION, SOLUTION INTRA-ARTICULAR; INTRALESIONAL; INTRAMUSCULAR; INTRAVENOUS; SOFT TISSUE AS NEEDED
Status: DISCONTINUED | OUTPATIENT
Start: 2024-08-26 | End: 2024-08-26 | Stop reason: SURG

## 2024-08-26 RX ORDER — FAMOTIDINE 20 MG/1
20 TABLET, FILM COATED ORAL ONCE
Status: COMPLETED | OUTPATIENT
Start: 2024-08-26 | End: 2024-08-26

## 2024-08-26 RX ADMIN — ACETAMINOPHEN 1000 MG: 500 TABLET ORAL at 06:35

## 2024-08-26 RX ADMIN — MELOXICAM 15 MG: 15 TABLET ORAL at 06:35

## 2024-08-26 RX ADMIN — HYDROMORPHONE HYDROCHLORIDE 0.5 MG: 1 INJECTION, SOLUTION INTRAMUSCULAR; INTRAVENOUS; SUBCUTANEOUS at 08:56

## 2024-08-26 RX ADMIN — DEXAMETHASONE SODIUM PHOSPHATE 4 MG: 4 INJECTION INTRA-ARTICULAR; INTRALESIONAL; INTRAMUSCULAR; INTRAVENOUS; SOFT TISSUE at 07:45

## 2024-08-26 RX ADMIN — LIDOCAINE HYDROCHLORIDE 0.5 ML: 10 INJECTION, SOLUTION EPIDURAL; INFILTRATION; INTRACAUDAL; PERINEURAL at 06:35

## 2024-08-26 RX ADMIN — HYDROMORPHONE HYDROCHLORIDE 0.5 MG: 1 INJECTION, SOLUTION INTRAMUSCULAR; INTRAVENOUS; SUBCUTANEOUS at 08:41

## 2024-08-26 RX ADMIN — GABAPENTIN 600 MG: 300 CAPSULE ORAL at 06:35

## 2024-08-26 RX ADMIN — ONDANSETRON 8 MG: 2 INJECTION INTRAMUSCULAR; INTRAVENOUS at 07:45

## 2024-08-26 RX ADMIN — SCOPOLAMINE 1 PATCH: 1.5 PATCH, EXTENDED RELEASE TRANSDERMAL at 06:35

## 2024-08-26 RX ADMIN — FAMOTIDINE 20 MG: 20 TABLET, FILM COATED ORAL at 06:35

## 2024-08-26 RX ADMIN — PROPOFOL 250 MG: 10 INJECTION, EMULSION INTRAVENOUS at 07:40

## 2024-08-26 RX ADMIN — SODIUM CHLORIDE, POTASSIUM CHLORIDE, SODIUM LACTATE AND CALCIUM CHLORIDE 9 ML/HR: 600; 310; 30; 20 INJECTION, SOLUTION INTRAVENOUS at 06:36

## 2024-08-26 RX ADMIN — LIDOCAINE HYDROCHLORIDE 100 MG: 10 INJECTION, SOLUTION EPIDURAL; INFILTRATION; INTRACAUDAL; PERINEURAL at 07:40

## 2024-08-26 RX ADMIN — SODIUM CHLORIDE 2000 MG: 900 INJECTION INTRAVENOUS at 07:38

## 2024-08-26 RX ADMIN — FENTANYL CITRATE 100 MCG: 50 INJECTION, SOLUTION INTRAMUSCULAR; INTRAVENOUS at 07:38

## 2024-08-26 NOTE — OP NOTE
HYDROCELECTOMY  Procedure Report    Patient Name:  Zane Esparza  YOB: 1965    Date of Surgery:  8/26/2024     Indications: 59-year-old male with history of large right-sided hydrocele.  He reports bother associated.  Desires intervention.  We have discussed the risk benefits alternatives of hydrocelectomy he elects proceed.    Pre-op Diagnosis:   Hydrocele, unspecified hydrocele type [N43.3]       Post-Op Diagnosis Codes:     * Hydrocele, unspecified hydrocele type [N43.3]      CPT: 19206, 69593, 08496     Procedure(s):  RIGHT HYDROCELECTOMY  EXCISION OF RIGHT APPENDIX TESTIS  EXCISION OF RIGHT APPENDIX EPIDIDYMIS    Staff:  Surgeon(s):  Aj Horowitz MD         Anesthesia: General    Estimated Blood Loss: minimal    Implants:    Nothing was implanted during the procedure    Specimen:          None    Drains: none        Findings:   Right hydrocelectomy, removal of hydrocele sac.  Approximately 100 to 120 mL of fluid removed.  Excision of right testicular appendage  Excision of right epididymal appendage    Complications: none immediate    Description of Procedure:   The patient was identified in the preoperative holding area where informed consent was reviewed and signed. He was transported the operating room per anesthesia and placed supine on the operating table.        4-6 cm incision was made along the right anterior hemiscrotum. Bovie cautery was used for hemostasis at the level of dartos tissue.  With the aid of the assistant, the dartos layers were carefully incised exposing the right hydrocele sac.  A hemostat clamp was used to dissected off the superficial layers overlying the hydrocele sac with the aid of the assistant of the Bovie cautery.  Eventually the entire hydrocele sac was cleared of the attached dartos tissue.      The testicle was identified within the hydrocele sac and avoided.  Bovie cautery was used to create a 1 cm incision, suction was used to remove hydrocele  fluid.  Hydrocele fluid was straw color and clear yellow.  Next the hydrocele sac was incised longitudinally towards the spermatic cord and inferiorly towards the tail of the epididymis. The testicle was then delivered from within the hydrocele sac, the edges of the hydrocele sac were inverted.          Prominent appendix testis was identified on the superior pole of the testicle.  This was excised using 15 blade scalpel. Hemostasis achieved.     Prominent appendix epididymis was identified on the epididymal head.  This was excised using 15 blade scalpel.  Hemostasis achieved.        Next a the hydrocele sac was excised with the Bovie cautery, taking care to avoid injury to the epididymis and spermatic cord itself.  The edges of the excised hydrocele sac were then cauterized with the Bovie cautery.  Via Jeboulay technique, the opposing edges of the remaining hydrocele sac were then sutured together with a running horizontal mattress 2-0 Vicryl stitch.          The testicular lie was then confirmed in the appropriate position and the testicle was returned to the hemiscrotal space after irrigation with sterile saline.        The right hemiscrotal space was then closed in 2 separate layers with a running 3-0 Vicryl stitch.  Prior to complete closure 4 mL of Tisseel was placed to aid in hemostasis.  The skin was then closed with a running horizontal mattress 3-0 Monocryl stitch.      The patient was awoken from general anesthesia and transported to the PACU in stable condition.        Disposition/Follow Up: To discharge from PACU.  Will follow-up in 3 to 4 weeks for wound check.         Aj Horowitz MD     Date: 8/26/2024  Time: 08:43 EDT

## 2024-08-26 NOTE — DISCHARGE INSTRUCTIONS
DISCHARGE INSTRUCTIONS - HYDROCELECTOMY    ER WARNINGS  When to call the [facility] at: [phone]   If you feel the need to urinate, but are unable to   Excessive drainage/bleeding from your incision   If your incision becomes red, warm, and/or you notice pus draining from the incision   Fever greater than 100.5F; excessive chills   Nausea/vomiting or inability to keep fluids down   Significant tenderness or swelling in the legs, chest, and/or shortness of breath   If it is a serious emergency, go directly to the Emergency Room or call 911, and then notify the [facility]    IF YOU HAVE ANY QUESTIONS, CALL THE [facility] at [phone]. IF YOU ARE CALLING AFTER HOURS, PLEASE CALL THE HOSPITAL MAINLINE TO TALK WITH THE ON-CALL DOCTOR    SCROTAL SUPPORT   For the next 2 weeks wear supportive tighter underwear or an athletic supporter to keep compression on the scrotum. This, and using ice packs on and off 20 minutes at a time for the next 72 hours will greatly reduce the swelling post-operatively.    Your scrotum may experience some bruising, but call if it is swelling more and turning completely purple. In the mean time you should apply pressure to the scrotum to stop the expanding hematoma (blood clot) if it starts.    Your scrotum and the testicle in question is expected to be somewhat swollen for the next 4-6 weeks.     PAIN CONTROL   Some pain and discomfort is common after surgery. Initially, your goal should be comfort, not complete pain relief.    If permitted by your overall medical condition, begin by taking over the counter pain and inflammation relievers:   o Tylenol (acetaminophen): 650mg every 6 hours   o Ibuprofen (Advil, Motrin): 600mg every 8 hours with food    o Only take narcotic medications if absolutely needed for comfort after the other medications have been taken   o Use of narcotic pain medications should be minimized due to the  potential for side effects and long term complications    ACTIVITY   Make no important decisions for 24 hours.    Do not drive or operate equipment for 24 hours or while taking pain medications.    No lifting more than 5 pounds for 2 weeks.    INCISION    Your incision is closed with absorbable suture, such that you do not need to have staples or stitches removed in the clinic.    You may shower tomorrow. Just do not scrub or brush over those areas until they are healed. Do not submerge the incision under water (for example in a pool, bathtub, etc.) until it is fully healed, usually about 3 weeks.   Call for any redness, secretions, or openings you may observe in your incision.

## 2024-08-26 NOTE — H&P
[facility] PREOPERATIVE HISTORY AND PHYSICAL       Chief complaint:  Right Hydrocele    Subjective:    Patient is a 59 y.o.male presents with history of right hydrocele for several months which has been progressively increasing in size over last few months.  It is bothersome to him as it causes him pain with activity and interrupting his daily life activities.  See office note dated 7/23/2024.  The patient is here today for scheduled and consented right hydrocelectomy.      Associated Documents:  Office Visit with Aj Horowitz MD (07/23/2024)     Review of Systems:  General ROS: negative for fever, chills, weakness, dizziness, headache, fatigue, weight changes  Cardiovascular ROS: no chest pain or dyspnea on exertion  Respiratory ROS: no cough, shortness of breath, or wheezing  GI ROS: no abdominal pain/discomfort, nausea, vomiting or diarrhea   ROS: no dysuria, hematuria or complaints  Skin ROS: no itching, rash or open wounds.        Allergies: No Known Allergies  Latex: no known allergy  Contrast Dye:  no known allergy      Home Meds    Medications Prior to Admission   Medication Sig Dispense Refill Last Dose    albuterol sulfate  (90 Base) MCG/ACT inhaler Inhale 2 puffs Every 6 (Six) Hours As Needed.   8/26/2024 at 0530    amLODIPine (NORVASC) 10 MG tablet Take 1 tablet by mouth.   8/26/2024 at 0430    aspirin 81 MG chewable tablet Chew 1 tablet.   8/26/2024 at 0430    atenolol (TENORMIN) 100 MG tablet Take 1 tablet by mouth Daily.   8/26/2024 at 0430    B-D ULTRAFINE III SHORT PEN 31G X 8 MM misc USE WITH INSULIN ONCE DAILY   8/25/2024 at 2000    Blood Glucose Monitoring Suppl (ONE TOUCH ULTRA 2) w/Device kit See Admin Instructions.   8/25/2024 at 2000    Chlorhexidine Gluconate 4 % solution Shower daily with hibiclens solution as directed 5 days prior to surgery. 236 mL 0 8/25/2024 at 2000    cloNIDine (CATAPRES) 0.2 MG tablet Take  by mouth 2 (Two) Times a Day.   8/26/2024 at 0430     ferrous sulfate 325 (65 FE) MG tablet Take 1 tablet by mouth Daily With Breakfast.   8/26/2024 at 0430    hydroCHLOROthiazide (HYDRODIURIL) 12.5 MG tablet Take 1 tablet by mouth Daily.   8/26/2024 at 0430    hydroxychloroquine (PLAQUENIL) 200 MG tablet Take 1 tablet by mouth.   8/25/2024 at 0800    Lantus SoloStar 100 UNIT/ML injection pen 80 Units Every Night.   8/26/2024 at [id number]    losartan (COZAAR) 50 MG tablet Take 1 tablet by mouth Daily.   8/25/2024 at 0800    NovoLOG FlexPen 100 UNIT/ML solution pen-injector sc pen 15 Units 3 (Three) Times a Day With Meals.   8/25/2024 at 2300    omeprazole (priLOSEC) 40 MG capsule Take 1 capsule by mouth Daily.   8/25/2024 at 2000    pravastatin (PRAVACHOL) 20 MG tablet Take 1 tablet by mouth Daily.   8/25/2024 at 2000    pregabalin (LYRICA) 75 MG capsule Take 1 capsule by mouth 3 (Three) Times a Day.   8/25/2024 at 2000    OneTouch Verio test strip TEST TWO TIMES A DAY       Semaglutide, 2 MG/DOSE, (Ozempic, 2 MG/DOSE,) 8 MG/3ML solution pen-injector Inject 2 mg under the skin into the appropriate area as directed. Ld 15th- pt aware to stop for procedure   8/15/2024 at 0800     PMH:   Past Medical History:   Diagnosis Date    Acromioclavicular separation     Anemia     Arthritis     Asthma     Back problem     Broken teeth     x2- top left side- getting pulled soon    Colonic fistula     Diabetes     Frozen shoulder     GERD (gastroesophageal reflux disease)     Gout     h/o    Hypertension     Knee swelling     Periarthritis of shoulder     Rotator cuff syndrome     Sarcoidosis     Tear of meniscus of knee     Wears glasses      PSH:    Past Surgical History:   Procedure Laterality Date    APPENDECTOMY  1981    COLON RESECTION      removal of part of colon that attached to bladder- repair-  mesh in place    COLONOSCOPY      ENDOSCOPY      KNEE ACL RECONSTRUCTION Right     few screws in place    LAMINECTOMY      15 years.     WISDOM TOOTH EXTRACTION      2 removed  "    Immunization History:   Immunization History   Administered Date(s) Administered    COVID-19 (MODERNA) 1st,2nd,3rd Dose Monovalent 2021, 2021    COVID-19 (MODERNA) Monovalent Original Booster 2022   Pneumococcal= yes   Influenza= yes   Tetanus= yes, up-to-date      Social History:  Social History     Tobacco Use    Smoking status: Former     Average packs/day: 0.3 packs/day for 0.5 years (0.1 ttl pk-yrs)     Types: Cigarettes     Start date:      Quit date:      Years since quittin.6     Passive exposure: Past    Smokeless tobacco: Never   Substance Use Topics    Alcohol use: Yes     Alcohol/week: 3.0 - 5.0 standard drinks of alcohol     Types: 3 - 5 Shots of liquor per week           Physical Exam:BP (!) 163/105 (BP Location: Right arm, Patient Position: Lying)   Pulse 73   Temp 98 °F (36.7 °C) (Tympanic)   Resp 16   Ht 172.7 cm (68\")   Wt 113 kg (250 lb)   SpO2 95%   BMI 38.01 kg/m²       General Appearance:    Alert, cooperative, no distress, appears stated age   Head:    Normocephalic, without obvious abnormality, atraumatic   Lungs:     Clear to auscultation bilaterally, respirations unlabored    Heart: Regular rate and rhythm, S1 and S2 normal, no murmur, rub    or gallop    Abdomen:    Soft without tenderness +bowel sounds   Breast Exam:    deferred   Genitalia:    deferred   Extremities:   Extremities normal, atraumatic, no cyanosis or edema   Skin:   Skin color, texture, turgor normal, no rashes or lesions   Neurologic:   Grossly intact     Results Review:   LABS:  Lab Results   Component Value Date    WBC 5.01 2024    HGB 15.4 2024    HCT 42.2 2024    MCV 74.2 (L) 2024     2024    NEUTROABS 5.63 2024    GLUCOSE 146 (H) 2024    BUN 15 2020    CREATININE 0.98 2020    EGFRIFAFRI 96 2020     2020    K 3.9 2024    CL 98 2020    CO2 26.0 2020    CALCIUM 9.8 2020    " ALBUMIN 4.20 11/02/2020    AST 29 11/02/2020    ALT 22 11/02/2020    BILITOT 0.3 11/02/2020       RADIOLOGY:  Imaging Results (Last 72 Hours)       ** No results found for the last 72 hours. **            Cancer Staging (if applicable):  Cancer Patient: __ yes __no __unknown; If yes, clinical stage T:__ N:__M:__, stage group    Impression:  RIGHT HYDROCELE     Plan:  HYDROCELECTOMY RIGHT     Violet BENITO Chisholm 8/26/2024 06:54 EDT

## 2024-08-26 NOTE — ANESTHESIA POSTPROCEDURE EVALUATION
Patient: Zane Esparza    Procedure Summary       Date: 08/26/24 Room / [facility]:  [facility]    Anesthesia Start: 0731 Anesthesia Stop: 0834    Procedure: HYDROCELECTOMY RIGHT (Right: Scrotum) Diagnosis:       Hydrocele, unspecified hydrocele type      (Hydrocele, unspecified hydrocele type [N43.3])    Surgeons: Aj Horowitz MD Provider: Bree Morris DO    Anesthesia Type: general ASA Status: 3            Anesthesia Type: general    Vitals  No vitals data found for the desired time range.  /113  HR 63 SR  RR 14  T 97F  SPO2 99% 2L NC        Post Anesthesia Care and Evaluation    Patient location during evaluation: PACU  Patient participation: complete - patient participated  Level of consciousness: awake and alert  Pain management: adequate    Airway patency: patent  Anesthetic complications: No anesthetic complications  PONV Status: none  Cardiovascular status: hemodynamically stable and acceptable  Respiratory status: nonlabored ventilation, acceptable and nasal cannula  Hydration status: acceptable

## 2024-08-26 NOTE — ANESTHESIA PROCEDURE NOTES
Airway  Urgency: elective    Date/Time: 8/26/2024 7:42 AM  Airway not difficult    General Information and Staff    Patient location during procedure: OR  CRNA/CAA: Gagan Lindsay CRNA    Indications and Patient Condition  Indications for airway management: airway protection    Preoxygenated: yes  Mask difficulty assessment: 1 - vent by mask    Final Airway Details  Final airway type: supraglottic airway      Successful airway: I-gel  Size 4     Number of attempts at approach: 1  Assessment: lips, teeth, and gum same as pre-op    Additional Comments  LMA placed without difficulty, ventilation with assist, equal breath sounds and symmetric chest rise and fall

## 2024-09-25 ENCOUNTER — OFFICE VISIT (OUTPATIENT)
Dept: UROLOGY | Facility: CLINIC | Age: 59
End: 2024-09-25
Payer: COMMERCIAL

## 2024-09-25 DIAGNOSIS — N43.3 HYDROCELE, UNSPECIFIED HYDROCELE TYPE: Primary | ICD-10-CM

## 2024-09-25 PROCEDURE — 99024 POSTOP FOLLOW-UP VISIT: CPT | Performed by: UROLOGY

## 2024-09-25 PROCEDURE — 1160F RVW MEDS BY RX/DR IN RCRD: CPT | Performed by: UROLOGY

## 2024-09-25 PROCEDURE — 1159F MED LIST DOCD IN RCRD: CPT | Performed by: UROLOGY

## 2024-09-25 RX ORDER — PERPHENAZINE 16 MG
600 TABLET ORAL
COMMUNITY
Start: 2024-08-20 | End: 2025-08-20

## 2024-09-25 RX ORDER — ALBUTEROL SULFATE AND BUDESONIDE 90; 80 UG/1; UG/1
90 AEROSOL, METERED RESPIRATORY (INHALATION)
COMMUNITY
Start: 2024-08-27 | End: 2024-09-26

## 2024-09-25 RX ORDER — TRIAMCINOLONE ACETONIDE 0.25 MG/G
OINTMENT TOPICAL
COMMUNITY
Start: 2024-07-17

## 2024-09-25 RX ORDER — LANCETS 33 GAUGE
EACH MISCELLANEOUS
COMMUNITY
Start: 2024-09-04

## 2024-09-25 RX ORDER — FOLIC ACID 1 MG/1
1 TABLET ORAL DAILY
COMMUNITY
Start: 2024-09-08

## 2024-09-25 RX ORDER — MELOXICAM 15 MG/1
15 TABLET ORAL DAILY
COMMUNITY
Start: 2024-04-01

## 2024-09-25 RX ORDER — CAPSAICIN 0.025 %
CREAM (GRAM) TOPICAL
COMMUNITY
Start: 2024-08-20

## 2024-12-31 ENCOUNTER — OFFICE VISIT (OUTPATIENT)
Dept: UROLOGY | Facility: CLINIC | Age: 59
End: 2024-12-31
Payer: COMMERCIAL

## 2024-12-31 VITALS — HEIGHT: 68 IN | BODY MASS INDEX: 37.59 KG/M2 | WEIGHT: 248 LBS

## 2024-12-31 DIAGNOSIS — N43.3 HYDROCELE, UNSPECIFIED HYDROCELE TYPE: Primary | ICD-10-CM

## 2024-12-31 RX ORDER — ALBUTEROL SULFATE AND BUDESONIDE 90; 80 UG/1; UG/1
2 AEROSOL, METERED RESPIRATORY (INHALATION) DAILY
COMMUNITY
Start: 2024-12-05 | End: 2025-01-04

## 2024-12-31 RX ORDER — DULOXETIN HYDROCHLORIDE 30 MG/1
30 CAPSULE, DELAYED RELEASE ORAL DAILY
COMMUNITY
Start: 2024-12-13 | End: 2025-12-13

## 2024-12-31 NOTE — PROGRESS NOTES
Follow Up Office Visit      Patient Name: Zane Esparza  : 1965   MRN: [id number]     Chief Complaint:    Chief Complaint   Patient presents with    Hydrocele, unspecified hydrocele type       History of Present Illness: Zane Esparza is a 59 y.o. male who presents today for  male who presents today for 3 month follow-up after hydrocelectomy.  Patient doing well.  Reports incision well-healing.  Denies significant pain or discomfort, reports improvement in scrotal swelling.     Subjective      Review of System: Review of Systems   Genitourinary:  Negative for decreased urine volume, difficulty urinating, dysuria, enuresis, flank pain, frequency, hematuria and urgency.   All other systems reviewed and are negative.     I have reviewed the ROS documented by my clinical staff, updated as appropriate and I agree. Aj Horowitz MD    I have reviewed and the following portions of the patient's history were updated as appropriate: past family history, past medical history, past social history, past surgical history and problem list.    Medications:     Current Outpatient Medications:     Airsupra 90-80 MCG/ACT aerosol, Take 2 puffs by mouth Daily., Disp: , Rfl:     albuterol sulfate  (90 Base) MCG/ACT inhaler, Inhale 2 puffs Every 6 (Six) Hours As Needed., Disp: , Rfl:     Alpha-Lipoic Acid 600 MG capsule, Take 600 mg by mouth., Disp: , Rfl:     amLODIPine (NORVASC) 10 MG tablet, Take 1 tablet by mouth., Disp: , Rfl:     aspirin 81 MG chewable tablet, Chew 1 tablet., Disp: , Rfl:     atenolol (TENORMIN) 100 MG tablet, Take 1 tablet by mouth Daily., Disp: , Rfl:     B-D ULTRAFINE III SHORT PEN 31G X 8 MM misc, USE WITH INSULIN ONCE DAILY, Disp: , Rfl:     Blood Glucose Monitoring Suppl (ONE TOUCH ULTRA 2) w/Device kit, See Admin Instructions., Disp: , Rfl:     capsaicin (ZOSTRIX) 0.025 % cream, Apply to feet and legs two times daily as needed for nerve pain, Disp: , Rfl:     Chlorhexidine  Gluconate 4 % solution, Shower daily with hibiclens solution as directed 5 days prior to surgery., Disp: 236 mL, Rfl: 0    cloNIDine (CATAPRES) 0.2 MG tablet, Take  by mouth 2 (Two) Times a Day., Disp: , Rfl:     ferrous sulfate 325 (65 FE) MG tablet, Take 1 tablet by mouth Daily With Breakfast., Disp: , Rfl:     folic acid (FOLVITE) 1 MG tablet, Take 1 tablet by mouth Daily., Disp: , Rfl:     hydroCHLOROthiazide (HYDRODIURIL) 12.5 MG tablet, Take 1 tablet by mouth Daily., Disp: , Rfl:     hydroxychloroquine (PLAQUENIL) 200 MG tablet, Take 1 tablet by mouth., Disp: , Rfl:     Lancets (OneTouch Delica Plus Tmtioz50P) misc, USE TO TEST BLOOD GLUCOSE 3 TIMES DAILY., Disp: , Rfl:     Lantus SoloStar 100 UNIT/ML injection pen, 80 Units Every Night., Disp: , Rfl:     losartan (COZAAR) 50 MG tablet, Take 1 tablet by mouth Daily., Disp: , Rfl:     meloxicam (MOBIC) 15 MG tablet, Take 1 tablet by mouth Daily., Disp: , Rfl:     NovoLOG FlexPen 100 UNIT/ML solution pen-injector sc pen, 15 Units 3 (Three) Times a Day With Meals., Disp: , Rfl:     omeprazole (priLOSEC) 40 MG capsule, Take 1 capsule by mouth Daily., Disp: , Rfl:     OneTouch Verio test strip, TEST TWO TIMES A DAY, Disp: , Rfl:     pravastatin (PRAVACHOL) 20 MG tablet, Take 1 tablet by mouth Daily., Disp: , Rfl:     pregabalin (LYRICA) 75 MG capsule, Take 1 capsule by mouth 3 (Three) Times a Day., Disp: , Rfl:     Semaglutide, 2 MG/DOSE, (Ozempic, 2 MG/DOSE,) 8 MG/3ML solution pen-injector, Inject 2 mg under the skin into the appropriate area as directed. Ld 15th- pt aware to stop for procedure, Disp: , Rfl:     triamcinolone (KENALOG) 0.025 % ointment, SMARTSIG:Topical, Disp: , Rfl:     DULoxetine (CYMBALTA) 30 MG capsule, Take 1 capsule by mouth Daily. (Patient not taking: Reported on 12/31/2024), Disp: , Rfl:     Allergies:   No Known Allergies          Objective     Physical Exam:   Vital Signs:   Vitals:    12/31/24 1253   Weight: 112 kg (248 lb)   Height:  "172.7 cm (67.99\")     Body mass index is 37.72 kg/m².     Physical Exam  Vitals and nursing note reviewed.   Constitutional:       Appearance: Normal appearance.   Pulmonary:      Effort: Pulmonary effort is normal.   Neurological:      Mental Status: He is alert and oriented to person, place, and time.   Psychiatric:         Mood and Affect: Mood normal.         Behavior: Behavior normal.           Labs:   Brief Urine Lab Results       None                 Lab Results   Component Value Date    GLUCOSE 146 (H) 08/19/2024    CALCIUM 9.8 11/02/2020     11/02/2020    K 3.9 08/19/2024    CO2 26.0 11/02/2020    CL 98 11/02/2020    BUN 15 11/02/2020    CREATININE 0.98 11/02/2020    EGFRIFAFRI 96 11/02/2020    BCR 15.3 11/02/2020    ANIONGAP 14.0 11/02/2020       Lab Results   Component Value Date    WBC 5.01 08/19/2024    HGB 15.4 08/19/2024    HCT 42.2 08/19/2024    MCV 74.2 (L) 08/19/2024     08/19/2024       Images:   X-ray chest PA and lateral    Result Date: 10/21/2024  No acute cardiopulmonary process. Images reviewed, interpreted, and dictated by Dr. Loretta Marcelino. Transcribed by Barbara Bucio PA-C.      Measures:   Tobacco:   Zane Esparza  reports that he quit smoking about 6 years ago. His smoking use included cigarettes. He started smoking about a year ago. He smoked an average of 0.3 packs per day. He has been exposed to tobacco smoke. He has never used smokeless tobacco.     Urine Incontinence: Patient reports that he is not currently experiencing any symptoms of urinary incontinence.     Assessment / Plan      Assessment/Plan:   59 y.o. male is seen today for 3 month postoperative follow-up after hydrocelectomy.  Doing well in the postoperative setting with reduction in scrotal swelling. Patient will follow up in 1 year or in the interim if needed. Patient is understanding and agreeable with plan of care.       Diagnoses and all orders for this visit:    1. Hydrocele, unspecified " hydrocele type (Primary)         Follow Up:   Return in about 1 year (around 12/31/2025) for Recheck.     I spent approximately 20 minutes providing clinical care for this patient; including review of patient's chart and provider documentation, face to face time spent with patient in examination room (obtaining history, performing physical exam, discussing diagnosis and management options), placing orders, and completing patient documentation.     Aj Horowitz MD  [facility] [place]

## 2025-04-11 ENCOUNTER — OFFICE VISIT (OUTPATIENT)
Dept: ORTHOPEDIC SURGERY | Facility: CLINIC | Age: 60
End: 2025-04-11
Payer: COMMERCIAL

## 2025-04-11 VITALS
WEIGHT: 243.4 LBS | BODY MASS INDEX: 36.89 KG/M2 | HEIGHT: 68 IN | DIASTOLIC BLOOD PRESSURE: 90 MMHG | SYSTOLIC BLOOD PRESSURE: 152 MMHG

## 2025-04-11 DIAGNOSIS — M17.31 POST-TRAUMATIC OSTEOARTHRITIS OF RIGHT KNEE: Primary | ICD-10-CM

## 2025-04-11 PROCEDURE — 1160F RVW MEDS BY RX/DR IN RCRD: CPT | Performed by: ORTHOPAEDIC SURGERY

## 2025-04-11 PROCEDURE — 99214 OFFICE O/P EST MOD 30 MIN: CPT | Performed by: ORTHOPAEDIC SURGERY

## 2025-04-11 PROCEDURE — 1159F MED LIST DOCD IN RCRD: CPT | Performed by: ORTHOPAEDIC SURGERY

## 2025-04-11 RX ORDER — PREGABALIN 75 MG/1
75 CAPSULE ORAL ONCE
OUTPATIENT
Start: 2025-04-11 | End: 2025-04-11

## 2025-04-11 RX ORDER — CHLORHEXIDINE GLUCONATE 40 MG/ML
SOLUTION TOPICAL
Qty: 236 ML | Refills: 0 | Status: SHIPPED | OUTPATIENT
Start: 2025-04-11

## 2025-04-11 RX ORDER — ACETAMINOPHEN 325 MG/1
1000 TABLET ORAL ONCE
OUTPATIENT
Start: 2025-04-11 | End: 2025-04-11

## 2025-04-11 NOTE — PROGRESS NOTES
Orthopaedic Clinic Note: Knee Established Patient    Chief Complaint   Patient presents with    Follow-up     11 month follow up--Post-traumatic osteoarthritis of right knee        HPI    It has been 11  month(s) since Mr. Esparza's last visit. He returns to clinic today for follow-up right knee osteoarthritis.  Patient has previously undergone treatment with cortisone injections as well as anti-inflammatories.  Despite these interventions, his pain is persisting.. He rates his pain a 5/10 on the pain scale and is currently taking Tylenol for pain. He is ambulating with no assistive device. He has not attempted therapy.  He denies fevers, chills, or constitutional symptoms.  Overall, he is doing worse.  He is ready to discuss surgical intervention for total knee arthroplasty.    I have reviewed the following portions of the patient's history:History of Present Illness    Past Medical History:   Diagnosis Date    Acromioclavicular separation     Anemia     Arthritis     Asthma     Back problem     Broken teeth     x2- top left side- getting pulled soon    Colonic fistula     Diabetes     Erectile dysfunction 2023    It doesn't work    Frozen shoulder     GERD (gastroesophageal reflux disease)     Gout     h/o    Hypertension     Knee swelling     Periarthritis of shoulder     Rotator cuff syndrome     Sarcoidosis     Tear of meniscus of knee     Undescended testicle     Wears glasses       Past Surgical History:   Procedure Laterality Date    APPENDECTOMY  1981    COLON RESECTION      removal of part of colon that attached to bladder- repair-  mesh in place    COLONOSCOPY      ENDOSCOPY      HYDROCELE EXCISION / REPAIR  8/25/2024    HYDROCELECTOMY Right 08/26/2024    Procedure: HYDROCELECTOMY RIGHT;  Surgeon: Aj Horowitz MD;  Location: [facility];  Service: Urology;  Laterality: Right;    KNEE ACL RECONSTRUCTION Right     few screws in place    LAMINECTOMY      15 years.     WISDOM TOOTH EXTRACTION      2 removed       Family History   Problem Relation Age of Onset    Cancer Mother     Hypertension Mother     Hypertension Father     Heart disease Father     Diabetes Father     Alzheimer's disease Maternal Grandmother     Heart block Maternal Grandmother     Cancer Maternal Grandmother     Heart block Maternal Grandfather     Diabetes Paternal Grandmother     Cancer Paternal Grandmother     Heart attack Paternal Grandfather     Cancer Maternal Aunt      Social History     Socioeconomic History    Marital status:     Number of children: 2   Tobacco Use    Smoking status: Former     Types: Cigarettes     Start date:      Quit date:      Years since quittin.2     Passive exposure: Past    Smokeless tobacco: Never   Vaping Use    Vaping status: Never Used   Substance and Sexual Activity    Alcohol use: Yes     Alcohol/week: 3.0 - 5.0 standard drinks of alcohol     Types: 3 - 5 Shots of liquor per week    Drug use: Not Currently     Types: Marijuana    Sexual activity: Yes     Partners: Female     Birth control/protection: None      Current Outpatient Medications on File Prior to Visit   Medication Sig Dispense Refill    albuterol sulfate  (90 Base) MCG/ACT inhaler Inhale 2 puffs Every 6 (Six) Hours As Needed.      Alpha-Lipoic Acid 600 MG capsule Take 600 mg by mouth.      amLODIPine (NORVASC) 10 MG tablet Take 1 tablet by mouth.      aspirin 81 MG chewable tablet Chew 1 tablet.      atenolol (TENORMIN) 100 MG tablet Take 1 tablet by mouth Daily.      B-D ULTRAFINE III SHORT PEN 31G X 8 MM misc USE WITH INSULIN ONCE DAILY      Blood Glucose Monitoring Suppl (ONE TOUCH ULTRA 2) w/Device kit See Admin Instructions.      capsaicin (ZOSTRIX) 0.025 % cream Apply to feet and legs two times daily as needed for nerve pain      Chlorhexidine Gluconate 4 % solution Shower daily with hibiclens solution as directed 5 days prior to surgery. 236 mL 0    cloNIDine (CATAPRES) 0.2 MG tablet Take  by mouth 2 (Two) Times a  Day.      ferrous sulfate 325 (65 FE) MG tablet Take 1 tablet by mouth Daily With Breakfast.      folic acid (FOLVITE) 1 MG tablet Take 1 tablet by mouth Daily.      hydroCHLOROthiazide (HYDRODIURIL) 12.5 MG tablet Take 1 tablet by mouth Daily.      hydroxychloroquine (PLAQUENIL) 200 MG tablet Take 1 tablet by mouth.      Lancets (OneTouch Delica Plus [id number]) misc USE TO TEST BLOOD GLUCOSE 3 TIMES DAILY.      Lantus SoloStar 100 UNIT/ML injection pen 80 Units Every Night.      losartan (COZAAR) 50 MG tablet Take 1 tablet by mouth Daily.      meloxicam (MOBIC) 15 MG tablet Take 1 tablet by mouth Daily.      NovoLOG FlexPen 100 UNIT/ML solution pen-injector sc pen 15 Units 3 (Three) Times a Day With Meals.      omeprazole (priLOSEC) 40 MG capsule Take 1 capsule by mouth Daily.      OneTouch Verio test strip TEST TWO TIMES A DAY      pravastatin (PRAVACHOL) 20 MG tablet Take 1 tablet by mouth Daily.      pregabalin (LYRICA) 75 MG capsule Take 1 capsule by mouth 3 (Three) Times a Day.      Semaglutide, 2 MG/DOSE, (Ozempic, 2 MG/DOSE,) 8 MG/3ML solution pen-injector Inject 2 mg under the skin into the appropriate area as directed. Ld 15th- pt aware to stop for procedure      triamcinolone (KENALOG) 0.025 % ointment SMARTSIG:Topical      [DISCONTINUED] DULoxetine (CYMBALTA) 30 MG capsule Take 1 capsule by mouth Daily. (Patient not taking: Reported on 12/31/2024)       No current facility-administered medications on file prior to visit.      Allergies   Allergen Reactions    Duloxetine Anaphylaxis        Review of Systems   Constitutional: Negative.    HENT: Negative.     Eyes: Negative.    Respiratory: Negative.     Cardiovascular: Negative.    Gastrointestinal: Negative.    Endocrine: Negative.    Genitourinary: Negative.    Musculoskeletal:  Positive for arthralgias.   Skin: Negative.    Allergic/Immunologic: Negative.    Neurological: Negative.    Hematological: Negative.    Psychiatric/Behavioral: Negative.       "    The patient's Review of Systems was personally reviewed and confirmed as accurate.    Physical Exam  Blood pressure 152/90, height 172.7 cm (67.99\"), weight 110 kg (243 lb 6.4 oz).    Body mass index is 37.02 kg/m².    GENERAL APPEARANCE: awake, alert, oriented, in no acute distress and well developed, well nourished  LUNGS:  breathing nonlabored  EXTREMITIES: no clubbing, cyanosis  PERIPHERAL PULSES: palpable dorsalis pedis and posterior tibial pulses bilaterally.    GAIT:  Antalgic        ----------  Right Knee Exam:  ----------  ALIGNMENT: severe varus, correctable to neutral  ----------  RANGE OF MOTION:  Decreased (10 - 115 degrees) with no extensor lag  LIGAMENTOUS STABILITY:   stable to varus and valgus stress at terminal extension and 30 degrees; retensioning of the MCL is appreciated with valgus stress at 30 degrees consistent with medial compartment degeneration  ----------  STRENGTH:  KNEE FLEXION 4/5  KNEE EXTENSION  4/5  ANKLE DORSIFLEXION  5/5  ANKLE PLANTARFLEXION  5/5  ----------  PAIN WITH PALPATION:global  KNEE EFFUSION: yes,  mild effusion  PAIN WITH KNEE ROM: yes  PATELLAR CREPITUS:  yes, painful and symptomatic  ----------  SENSATION TO LIGHT TOUCH:  DEEP PERONEAL/SUPERFICIAL PERONEAL/SURAL/SAPHENOUS/TIBIAL:    intact  ----------  EDEMA: 1+ edema to mid tibia  ERYTHEMA:    no  WOUNDS/INCISIONS:   no   _____________________________________________________________________  _____________________________________________________________________    RADIOGRAPHIC FINDINGS:   Indication: Right knee pain    Comparison: Todays xrays were compared to previous xrays from 3/14/2024    Knee films: moderate to severe tricompartmental arthritis with genu varum alignment and bone-on-bone articulation medial compartment (Kellgren-Tanmay grade 4), periarticular osteophytes visualized in all compartments.  Retained hardware in the distal femur and proximal tibia is identified and unchanged in alignment " compared to prior imaging. Radiographs demonstrate worsening deformity with advancing arthritic changes and wear compared to prior radiographs.    Assessment/Plan:   Diagnosis Plan   1. Post-traumatic osteoarthritis of right knee  XR Knee 4+ View Right    Case Request    CBC and Differential    Comprehensive metabolic panel    Protime-INR    APTT    Hemoglobin A1c    ECG 12 Lead    Nicotine & Metabolite, Quant    Tranexamic Acid 1,000 mg in sodium chloride 0.9 % 100 mL    Tranexamic Acid 1,000 mg in sodium chloride 0.9 % 100 mL    ethyl alcohol 62 % 2 each    ceFAZolin (ANCEF) 2 g in sodium chloride 0.9 % 100 mL IVPB    acetaminophen (TYLENOL) tablet 975 mg    pregabalin (LYRICA) capsule 75 mg    Case Request    CT Lower Extremity Right Without Contrast        The patient has clinical and radiographic evidence of end-stage right knee joint degeneration. Conservative measures have been tried for 3 months or longer, but have failed to adequately treat or improve the patient's symptoms. Pain is restricting the patient's daily activities as well as quality of life. The recommendation at this time is to proceed with a right total knee arthroplasty with the goal to improve patient function and pain. The risks, benefits, potential complications, and alternatives were discussed with the patient in detail. Risks included but were not limited to bleeding, infection, anesthesia risks, damage to neurovascular structures, osteolysis, aseptic loosening, instability, dislocation, pain, continued pain, iatrogenic fracture, possible need for future surgery including the potential for amputation, blood clots, myocardial infarction, stroke, and death. Vannesa-operative blood management and the potential for blood transfusion were discussed with risks and options clearly outlined. Specific details of the surgical procedure, hospitalization, recovery, rehabilitation, and long-term precautions were also presented. Pre-operative teaching was  provided. Implant/prosthesis selection was outlined, and the many options available were explained; the final choice will be made at the time of the procedure to match the anatomy and condition of the bone, ligaments, tendons, and muscles. Given this instruction, the patient elected to proceed with the right total knee arthroplasty. The patient will be seen by pre-admission testing for pre-operative optimization and risk assessment and will be scheduled for surgery once this is completed.    The patient is considered state risk for DVT based on patient risk factors and will be placed on aspirin postoperatively for DVT prophylaxis.        Aguila Campos MD  04/11/25  12:18 EDT

## 2025-07-07 ENCOUNTER — TELEPHONE (OUTPATIENT)
Dept: ORTHOPEDIC SURGERY | Facility: CLINIC | Age: 60
End: 2025-07-07
Payer: COMMERCIAL

## 2025-07-07 NOTE — TELEPHONE ENCOUNTER
The [facility] received a fax that requires your attention. The document has been indexed to the patient’s chart for your review.      Reason for sending: RECEIVED DETAILED WRITTEN ORDER FOR ORTHOPEDIC BRACE THAT NEEDS TO BE SIGNED BY THE PROVIDER    Documents Description: DETAILED WRITTEN ORDER-[facility] SUPPLIES-7/3/2025    Name of Sender: [facility]    Date Indexed: 7/7/2025

## 2025-07-08 NOTE — TELEPHONE ENCOUNTER
Called patient to confirm that he has requested a brace for his right knee since order came from outside company. Unable to leave voicemail. Will call again tomorrow.     Zahra

## 2025-07-22 ENCOUNTER — TELEPHONE (OUTPATIENT)
Dept: ORTHOPEDIC SURGERY | Facility: CLINIC | Age: 60
End: 2025-07-22
Payer: COMMERCIAL

## 2025-07-22 NOTE — TELEPHONE ENCOUNTER
Vi from [facility] called regarding the brace the patient was requesting. They are waiting for the status of the request.    Call back: [phone]

## 2025-07-22 NOTE — TELEPHONE ENCOUNTER
I spoke with [facility]. Patient had requested a brace from them and requires Dr. Campos to sign off on the request. I provided [place] with out fax number and asked to them to resend the request.    -Evelyn Costello MA

## 2025-07-22 NOTE — TELEPHONE ENCOUNTER
Dr. Andres Rivas signed paperwork but would like you to call the patient to see if he is still interested in surgery of the knee. He last saw [facility] in April and was going to sign up for surgery. If he still wants surgery, the brace will not really be beneficial to him.   Meghann James RT (R), ROT

## 2025-07-23 NOTE — TELEPHONE ENCOUNTER
I spoke with Mr. Esparza who stated he does want to go through with a total knee arthroplasty, however, he has been putting it off because he does not have a place to stay that does not require him to go up and down stairs during the first 2 weeks post-op. He stated he does not know how long it will be until the surgery can be scheduled. He recently has experienced episode of his knee giving way on him and would like a brace in the meantime. Patient stated that the brace with [facility] is fully covered. I informed him I will  fax in the signed forms from Dr. Campos.    Patient verbalized understanding.    Evelyn Costello MA

## 2025-07-28 NOTE — TELEPHONE ENCOUNTER
I spoke with Mr. Esparza who confirmed that he wants the knee brace from [facility]. I informed him I would have Dr. Campos sign the new form they faxed to us.     Mr. Esparza apologized for missing his appointment with Dr. Gallardo on Friday and will call to reschedule at a later time.    Evelyn Costello MA

## (undated) DEVICE — ADHS SKIN PREMIERPRO EXOFIN TOPICAL HI/VISC .5ML

## (undated) DEVICE — ATHLETIC SUPPORTER LATEX FREE, XLARGE

## (undated) DEVICE — PK MINOR SPLT 10

## (undated) DEVICE — TRAP FLD MINIVAC MEGADYNE 100ML

## (undated) DEVICE — GAUZE FLUFF 1 PLY: Brand: DEROYAL

## (undated) DEVICE — SUT GUT CHRM 3/0 SH 27IN G122H

## (undated) DEVICE — GLV SURG BIOGEL LTX PF 7 1/2

## (undated) DEVICE — UNDERGLV SURG BIOGEL INDICAT PF 71/2 GRN

## (undated) DEVICE — SUT GUT CHRM 0 STD TIES 54IN S114H

## (undated) DEVICE — ANTIBACTERIAL UNDYED BRAIDED (POLYGLACTIN 910), SYNTHETIC ABSORBABLE SUTURE: Brand: COATED VICRYL

## (undated) DEVICE — SPNG GZ WOVN 4X4IN 12PLY 10/BX STRL

## (undated) DEVICE — PREMIUM DRY TRAY LF: Brand: MEDLINE INDUSTRIES, INC.

## (undated) DEVICE — PENCL SMOKE/EVAC MEGADYNE TELESCP 10FT

## (undated) DEVICE — SEALANT WND FIBRIN TISSEEL PREFIL/SYR/PRIMAFZ 4ML